# Patient Record
Sex: MALE | Race: BLACK OR AFRICAN AMERICAN | NOT HISPANIC OR LATINO | Employment: FULL TIME | URBAN - METROPOLITAN AREA
[De-identification: names, ages, dates, MRNs, and addresses within clinical notes are randomized per-mention and may not be internally consistent; named-entity substitution may affect disease eponyms.]

---

## 2017-12-26 ENCOUNTER — HOSPITAL ENCOUNTER (EMERGENCY)
Facility: HOSPITAL | Age: 32
Discharge: HOME | End: 2017-12-26
Attending: EMERGENCY MEDICINE | Admitting: EMERGENCY MEDICINE
Payer: COMMERCIAL

## 2017-12-26 VITALS
OXYGEN SATURATION: 99 % | DIASTOLIC BLOOD PRESSURE: 68 MMHG | WEIGHT: 215 LBS | RESPIRATION RATE: 16 BRPM | TEMPERATURE: 97.8 F | HEART RATE: 83 BPM | SYSTOLIC BLOOD PRESSURE: 152 MMHG

## 2017-12-26 DIAGNOSIS — K64.4 EXTERNAL HEMORRHOID: Primary | ICD-10-CM

## 2017-12-26 PROCEDURE — 99282 EMERGENCY DEPT VISIT SF MDM: CPT

## 2017-12-26 NOTE — DISCHARGE INSTRUCTIONS
Use preparation H, Sitz baths, a donut pillow to sit on, and takes stool softeners so that you do not have to push to strain  Follow up with your primary care doctor to make sure you are doing better  If using these treatments does not help, then you may need to be referred to a surgeon  Hemorrhoids   WHAT YOU NEED TO KNOW:   Hemorrhoids are swollen blood vessels inside your rectum (internal hemorrhoids) or on your anus (external hemorrhoids)  Sometimes a hemorrhoid may prolapse  This means it extends out of your anus  DISCHARGE INSTRUCTIONS:   Return to the emergency department if:   · You have severe pain in your rectum or around your anus  · You have severe pain in your abdomen and you are vomiting  · You have bleeding from your anus that soaks through your underwear  Contact your healthcare provider if:   · You have frequent and painful bowel movements  · Your hemorrhoid looks or feels more swollen than usual      · You do not have a bowel movement for 2 days or more  · You see or feel tissue coming through your anus  · You have questions or concerns about your condition or care  Medicines: You may  need any of the following:  · A pad, cream, or ointment  can help decrease pain, swelling, and itching  · Stool softeners  help treat or prevent constipation  · NSAIDs , such as ibuprofen, help decrease swelling, pain, and fever  NSAIDs can cause stomach bleeding or kidney problems in certain people  If you take blood thinner medicine, always ask your healthcare provider if NSAIDs are safe for you  Always read the medicine label and follow directions  · Take your medicine as directed  Contact your healthcare provider if you think your medicine is not helping or if you have side effects  Tell him or her if you are allergic to any medicine  Keep a list of the medicines, vitamins, and herbs you take  Include the amounts, and when and why you take them   Bring the list or the pill bottles to follow-up visits  Carry your medicine list with you in case of an emergency  Manage your symptoms:   · Apply ice on your anus for 15 to 20 minutes every hour or as directed  Use an ice pack, or put crushed ice in a plastic bag  Cover it with a towel before you apply it to your anus  Ice helps prevent tissue damage and decreases swelling and pain  · Take a sitz bath  Fill a bathtub with 4 to 6 inches of warm water  You may also use a sitz bath pan that fits inside a toilet bowl  Sit in the sitz bath for 15 minutes  Do this 3 times a day, and after each bowel movement  The warm water can help decrease pain and swelling  · Keep your anal area clean  Gently wash the area with warm water daily  Soap may irritate the area  After a bowel movement, wipe with moist towelettes or wet toilet paper  Dry toilet paper can irritate the area  Prevent hemorrhoids:   · Do not strain to have a bowel movement  Do not sit on the toilet too long  These actions can increase pressure on the tissues in your rectum and anus  · Drink plenty of liquids  Liquids can help prevent constipation  Ask how much liquid to drink each day and which liquids are best for you  · Eat a variety of high-fiber foods  Examples include fruits, vegetables, and whole grains  Ask your healthcare provider how much fiber you need each day  You may need to take a fiber supplement  · Exercise as directed  Exercise, such as walking, may make it easier to have a bowel movement  Ask your healthcare provider to help you create an exercise plan  · Do not have anal sex  Anal sex can weaken the skin around your rectum and anus  · Avoid heavy lifting  This can cause straining and increase your risk for another hemorrhoid  Follow up with your healthcare provider as directed:  Write down your questions so you remember to ask them during your visits     © 2017 [employer] Information is for End User's use only and may not be sold, redistributed or otherwise used for commercial purposes  All illustrations and images included in CareNotes® are the copyrighted property of [employer] Inc  or Doroteo [employer]  The above information is an  only  It is not intended as medical advice for individual conditions or treatments  Talk to your doctor, nurse or pharmacist before following any medical regimen to see if it is safe and effective for you

## 2017-12-26 NOTE — ED PROVIDER NOTES
History  Chief Complaint   Patient presents with    Hemorrhoids     pt states "I've had some heavy rectal bleeding for two months from hemorrhoids but now my hemorrhoid is external" pt reports no bleeding for 2 days     HPI    None       Past Medical History:   Diagnosis Date    Hemorrhoids        History reviewed  No pertinent surgical history  History reviewed  No pertinent family history  I have reviewed and agree with the history as documented  Social History   Substance Use Topics    Smoking status: Never Smoker    Smokeless tobacco: Never Used    Alcohol use 3 0 oz/week     5 Cans of beer per week        Review of Systems    Physical Exam  ED Triage Vitals   Temperature Pulse Respirations Blood Pressure SpO2   12/26/17 1758 12/26/17 1756 12/26/17 1756 12/26/17 1756 12/26/17 1756   97 8 °F (36 6 °C) 83 16 152/68 99 %      Temp Source Heart Rate Source Patient Position - Orthostatic VS BP Location FiO2 (%)   12/26/17 1758 12/26/17 1756 12/26/17 1756 12/26/17 1756 --   Temporal Monitor Sitting Left arm       Pain Score       12/26/17 1756       5           Orthostatic Vital Signs  Vitals:    12/26/17 1756   BP: 152/68   Pulse: 83   Patient Position - Orthostatic VS: Sitting       Physical Exam   Constitutional: He is oriented to person, place, and time  He appears well-developed and well-nourished  No distress  HENT:   Head: Normocephalic and atraumatic  Mouth/Throat: Oropharynx is clear and moist    Eyes: Conjunctivae are normal  Pupils are equal, round, and reactive to light  Neck: Normal range of motion  No tracheal deviation present  Cardiovascular: Normal rate, regular rhythm, normal heart sounds and intact distal pulses  Pulmonary/Chest: Effort normal and breath sounds normal  No respiratory distress  Abdominal: Soft  Bowel sounds are normal  He exhibits no distension  There is no tenderness     Genitourinary: Rectal exam shows external hemorrhoid (large, tender, not thrombosed, no active bleeding )  Rectal exam shows no fissure  Genitourinary Comments: Chaperoned by Sandrita Huynh, patient care [employer] [facility]   Neurological: He is alert and oriented to person, place, and time  GCS eye subscore is 4  GCS verbal subscore is 5  GCS motor subscore is 6  Skin: Skin is warm and dry  Psychiatric: He has a normal mood and affect  His behavior is normal    Nursing note and vitals reviewed  ED Medications  Medications - No data to display    Diagnostic Studies  Results Reviewed     None                 No orders to display              Procedures  Procedures       Phone Contacts  ED Phone Contact    ED Course  ED Course                                MDM  Number of Diagnoses or Management Options  External hemorrhoid: new and does not require workup  Diagnosis management comments: This is a 44-year-old male who presents here with hemorrhoids  He states he has had these for awhile and has had "heavy bleeding" from these, which he states is the entire bowl being filled with blood  He does pass stool and blood with it  There is no blood on his clothes between bowel movements  He has had problems with constipation and frequently has to push and strain to move his bowels  He states he feels like hemorrhoid is getting bigger  It has been more painful recently  He has not had as much blood the past couple of days as he has had previously  He has not been taking or doing anything for his symptoms  He states he has mentioned this to his doctor and was told that it was heal on his own, and was not told to take or doing thing for this  He has not followed up with his doctor for this  He denies any abdominal pain, bleeding dyscrasias, blood thinning medication, other sites of bleeding  Per review of Care everywhere, he was most recently seen on 11/30/17 by a the PA for his doctor, was told to use preparation H, Sitz baths, increase his fiber, Metamucil, and stool softener    He did have blood work done that showed a hemoglobin of 14 8     ROS: Otherwise negative, unless stated as above  He is well-appearing, in no acute distress  He has a large external hemorrhoid that is not thrombosed nor actively bleeding  I discussed with him treatment of this at home, that it will not improve unless he does when he is advised follow up with his primary care doctor for further evaluation, and indications for return, and he expresses understanding with this plan  CritCare Time    Disposition  Final diagnoses:   External hemorrhoid     Time reflects when diagnosis was documented in both MDM as applicable and the Disposition within this note     Time User Action Codes Description Comment    12/26/2017  6:35 PM [facility] External hemorrhoid       ED Disposition     ED Disposition Condition Comment    Discharge  Asenath Chill discharge to home/self care  Condition at discharge: Good        Follow-up Information     Follow up With Specialties Details Why Miles Jennings MD  Schedule an appointment as soon as possible for a visit to follow up on your symptoms [address] [place] [address] [id number] [place] [address] [address]  [phone]          Patient's Medications    No medications on file     No discharge procedures on file      ED Provider  Electronically Signed by           Aysha Renee MD  12/26/17 2005

## 2019-05-07 ENCOUNTER — APPOINTMENT (EMERGENCY)
Dept: RADIOLOGY | Facility: HOSPITAL | Age: 34
End: 2019-05-07
Payer: COMMERCIAL

## 2019-05-07 ENCOUNTER — HOSPITAL ENCOUNTER (EMERGENCY)
Facility: HOSPITAL | Age: 34
Discharge: HOME | End: 2019-05-07
Attending: EMERGENCY MEDICINE
Payer: COMMERCIAL

## 2019-05-07 ENCOUNTER — TELEPHONE (OUTPATIENT)
Dept: OBGYN CLINIC | Facility: CLINIC | Age: 34
End: 2019-05-07

## 2019-05-07 VITALS
RESPIRATION RATE: 16 BRPM | DIASTOLIC BLOOD PRESSURE: 75 MMHG | HEART RATE: 71 BPM | WEIGHT: 215 LBS | SYSTOLIC BLOOD PRESSURE: 135 MMHG | BODY MASS INDEX: 26.73 KG/M2 | HEIGHT: 75 IN | OXYGEN SATURATION: 100 % | TEMPERATURE: 98 F

## 2019-05-07 DIAGNOSIS — S43.401A SPRAIN OF RIGHT SHOULDER: Primary | ICD-10-CM

## 2019-05-07 DIAGNOSIS — M77.8 TENDONITIS OF SHOULDER, RIGHT: ICD-10-CM

## 2019-05-07 PROCEDURE — 99283 EMERGENCY DEPT VISIT LOW MDM: CPT | Performed by: EMERGENCY MEDICINE

## 2019-05-07 PROCEDURE — 73030 X-RAY EXAM OF SHOULDER: CPT

## 2019-05-07 PROCEDURE — 99283 EMERGENCY DEPT VISIT LOW MDM: CPT

## 2019-05-07 RX ORDER — NAPROXEN 500 MG/1
500 TABLET ORAL 2 TIMES DAILY WITH MEALS
Qty: 20 TABLET | Refills: 0 | Status: SHIPPED | OUTPATIENT
Start: 2019-05-07 | End: 2019-07-31

## 2019-07-31 ENCOUNTER — OFFICE VISIT (OUTPATIENT)
Dept: OBGYN CLINIC | Facility: CLINIC | Age: 34
End: 2019-07-31
Payer: COMMERCIAL

## 2019-07-31 VITALS
HEART RATE: 71 BPM | HEIGHT: 75 IN | WEIGHT: 221 LBS | SYSTOLIC BLOOD PRESSURE: 120 MMHG | DIASTOLIC BLOOD PRESSURE: 74 MMHG | BODY MASS INDEX: 27.48 KG/M2

## 2019-07-31 DIAGNOSIS — M75.21 BICEPS TENDINITIS OF RIGHT UPPER EXTREMITY: Primary | ICD-10-CM

## 2019-07-31 PROCEDURE — 99203 OFFICE O/P NEW LOW 30 MIN: CPT | Performed by: PHYSICIAN ASSISTANT

## 2019-07-31 NOTE — PROGRESS NOTES
_____________________________________________________  CHIEF COMPLAINT:  Chief Complaint   Patient presents with    Right Shoulder - Pain         SUBJECTIVE:  Julian Keen is a 30y o  year old male who presents right shoulder pain  Patient states has been going on for a year  He is unsure as to when the injury occurred  He states he may have been playing organized sports or he may have been lifting something heavy  He has not been taking any anti-inflammatories  He states that the pain is constant  He notes worse pain overhead movements  He states most of his pain is in the anterior aspect of his shoulder  He has not had any surgical intervention  He has not done any physical therapy  He denies any numbness or tingling  He denies any constitutional signs or symptoms  PAST MEDICAL HISTORY:  Past Medical History:   Diagnosis Date    Hemorrhoids        PAST SURGICAL HISTORY:  History reviewed  No pertinent surgical history  FAMILY HISTORY:  History reviewed  No pertinent family history  SOCIAL HISTORY:  Social History     Tobacco Use    Smoking status: Never Smoker    Smokeless tobacco: Never Used   Substance Use Topics    Alcohol use: Yes     Alcohol/week: 5 0 standard drinks     Types: 5 Cans of beer per week    Drug use: No       MEDICATIONS:  No current outpatient medications on file      ALLERGIES:  No Known Allergies    REVIEW OF SYSTEMS:  General: no fever, no chills  HEENT:  No loss of hearing or eyesight problems  Eyes:  No red eyes  Respiratory:  No coughing, shortness of breath or wheezing  Cardiovascular:  No chest pain, no palpitations  GI:  Abdomen soft nontender, denies nausea  Endocrine:  No muscle weakness, no frequent urination, no excessive thirst  Urinary:  No dysuria, no incontinence  Musculoskeletal: see HPI and PE  SKIN:  No skin rash, no dry skin  Neurological:  No headaches, no confusion  Psychiatric:  No suicide thoughts, no anxiety, no depression  Review of all other systems is negative    LABS:  HgA1c: No results found for: HGBA1C  BMP: No results found for: GLUCOSE, CALCIUM, NA, K, CO2, CL, BUN, CREATININE    _____________________________________________________  PHYSICAL EXAMINATION:  Vitals:    07/31/19 1104   BP: 120/74   Pulse: 71       General: well developed and well nourished, alert, oriented times 3 and appears comfortable  Psychiatric: Normal  HEENT: Trachea Midline, No torticollis  Cardiovascular: No discernable arrhythmia  Pulmonary: No wheezing or stridor  Skin: No masses, erthema, lacerations, fluctation, ulcerations  Neurovascular: Sensation Intact to the Median, Ulnar, Radial Nerve, Motor Intact to the Median, Ulnar, Radial Nerve and Pulses Intact    MUSCULOSKELETAL EXAMINATION:  Right shoulder  No erythema edema or ecchymosis noted  Skin is warm to touch  Tender to palpation over the anterior aspect of the shoulder  Range of motion is within normal limits for flexion, extension, internal and external rotation, abduction and adduction  Strength is 5/5 in all directions  Negative empty can test, negative Yoon test, negative Taylor's test, positive speed's test for pain over the biceps tendon  Patient is neurovascularly intact     _____________________________________________________  STUDIES REVIEWED:  I personally reviewed the x-rays from 05/07/2019  X-rays demonstrate no acute fractures or dislocations  Glenohumeral joint is well aligned        ASSESSMENT/PLAN:        Biceps tendinitis of right upper extremity  - it was discussed with the patient that he will start to take some anti-inflammatories as well as start physical therapy to work on range of motion, stretching, strengthening modalities as needed for pain  - he was advised that he can use his shoulder and upper extremity as tolerated for his ADLs  - he will follow up in 6 weeks with Dr Dea Haddad for re-evaluation    Follow Up:  Six weeks    PROCEDURES PERFORMED:  None Septic encephalopathy

## 2020-01-07 ENCOUNTER — OFFICE VISIT (OUTPATIENT)
Dept: FAMILY MEDICINE CLINIC | Facility: CLINIC | Age: 35
End: 2020-01-07
Payer: COMMERCIAL

## 2020-01-07 VITALS
DIASTOLIC BLOOD PRESSURE: 80 MMHG | TEMPERATURE: 97 F | HEIGHT: 73 IN | HEART RATE: 76 BPM | RESPIRATION RATE: 16 BRPM | SYSTOLIC BLOOD PRESSURE: 130 MMHG | BODY MASS INDEX: 30.06 KG/M2 | WEIGHT: 226.8 LBS

## 2020-01-07 DIAGNOSIS — M25.551 PAIN OF RIGHT HIP JOINT: ICD-10-CM

## 2020-01-07 DIAGNOSIS — L30.9 DERMATITIS: Primary | ICD-10-CM

## 2020-01-07 PROCEDURE — 3008F BODY MASS INDEX DOCD: CPT | Performed by: FAMILY MEDICINE

## 2020-01-07 PROCEDURE — 99203 OFFICE O/P NEW LOW 30 MIN: CPT | Performed by: FAMILY MEDICINE

## 2020-01-07 RX ORDER — PREDNISONE 50 MG/1
50 TABLET ORAL
Qty: 5 TABLET | Refills: 0 | Status: SHIPPED | OUTPATIENT
Start: 2020-01-07 | End: 2020-01-12

## 2020-01-07 NOTE — PROGRESS NOTES
Assessment/Plan     Dermatitis  - larger lesion on thigh could represent herald patch however, extensive puritis less likely with pityriasis rosea  More likely contact dermatitis to allergen  Advised patient not keep same laundry detergent and not use perfumed soaps on skin  - patient requesting blood for allergy testing, will order [facility] panel to check IgE levels  - No signs of scabies  - will prescribe prednisone 50 mg for 5 days, return if rash does not improve  Patient requesting referral to allergist for specific skin testing to figure out definitive allergies  Pain of right hip joint  - benign physical exam except IT band tightness near knee, no physical exam findings of bursitis, was previously a , could have underlying labrum issue, will refer to sports medicine for further workup  Diagnoses and all orders for this visit:    Dermatitis  -     [facility], Adult; Future  -     Food Specific IgG Allergy (Adult) Panel; Future  -     Ambulatory referral to Allergy; Future  -     predniSONE 50 mg tablet; Take 1 tablet (50 mg total) by mouth daily with lunch for 5 days    Pain of right hip joint  -     Ambulatory referral to Sports Medicine; Future         Subjective     Chief Complaint: Rash    HPI:   This is a 31 yo M who presents for 3 days of rash  He noticed the rash 3 days ago on his back that has since spread to his bilateral upper and lower extremity and trunk  He did not notice any recent bug bites or ticks  He reports the rash is very pruritic and did not try any over the counter medications  He also reports several weeks of Hip pain  He reports that this is noticed when he lays down on this side  He reports he was previous athletic when he was younger, hasn't recently gone to the gym, does report a previous labrum tear in shoulder when he was a        FM history: dad had prostate cancer at unknown age, mother had breast cancer, no history of colon cancer           Review of Systems  Review of Systems   Constitutional: Negative for activity change, fatigue and fever  HENT: Negative for congestion  Respiratory: Negative for cough and shortness of breath  Cardiovascular: Negative for chest pain and palpitations  Gastrointestinal: Negative for abdominal distention, constipation, diarrhea, nausea and vomiting  Musculoskeletal:        As noted in HPI    Skin: Positive for rash  Neurological: Negative for dizziness and headaches  Objective   Vitals:    01/07/20 1351   BP: 130/80   Pulse: 76   Resp: 16   Temp: (!) 97 °F (36 1 °C)       Physical Exam   Constitutional: He appears well-developed and well-nourished  No distress  HENT:   Mouth/Throat: Oropharynx is clear and moist    Neck: Neck supple  Cardiovascular: Normal rate, regular rhythm and normal heart sounds  Exam reveals no friction rub  No murmur heard  Pulmonary/Chest: Effort normal and breath sounds normal  He has no wheezes  He has no rales  Abdominal: Soft  Bowel sounds are normal  He exhibits no distension  There is no tenderness  Musculoskeletal:   No tenderness to palpation over greater trochanter or IT band, Negative JOSÉ test, good ROM of hip with no crepitus or clicking of joint  Tight distal IT band  Skin: He is not diaphoretic  Diffuse papular erythematous rash, larger erythematous round lesion on left thigh  Lab Results: I have reviewed all the lab results

## 2020-01-07 NOTE — PATIENT INSTRUCTIONS
Urticaria   WHAT YOU NEED TO KNOW:   What is urticaria? Urticaria is also called hives  Hives can change size and shape, and appear anywhere on your skin  They can be mild or severe and last from a few minutes to a few days  Hives may be a sign of a severe allergic reaction called anaphylaxis that needs immediate treatment  Urticaria that lasts longer than 6 weeks may be a chronic condition that needs long-term treatment  What causes urticaria? Hives are caused by an immune system reaction  The following are common triggers:  · Food allergies, such as to nuts, eggs, or shellfish    · Food dyes, additives, or preservatives    · Medicine allergies, such as to ibuprofen or antibiotics    · Infections, such as a cold or mono    · Bug bites    · Pets, plants, or latex    · Stress  How is the cause of urticaria diagnosed? Your healthcare provider will examine you and ask about your symptoms  He may also ask about your family medical history, medicines you take, and foods you eat  Tell your healthcare provider about any recent trauma, stress, or contact with allergens  You may need additional testing if you developed anaphylaxis after you were exposed to a trigger and then exercised  This is called exercise-induced anaphylaxis  You may need any of the following:  · A skin test  is used to see how your skin reacts to possible triggers  Your healthcare provider will put a small amount of the trigger onto your skin  He will cover the area with a patch that stays on for 2 days  Then he will check your skin for a reaction  · A challenge test  is used to give you increasing doses of what may be causing your hives  Your healthcare provider will watch for a reaction  How is urticaria treated? Hives often go away without treatment  Chronic urticaria may need to be treated with more than one medicine, or other medicines than listed below   The following are common medicines used to treat urticaria:  · Antihistamines decrease mild symptoms such as itching or a rash  · Steroids  decrease redness, pain, and swelling  · Epinephrine  is used to treat severe allergic reactions such as anaphylaxis  What steps do I need to take for signs or symptoms of anaphylaxis? · Immediately  give 1 shot of epinephrine only into the outer thigh muscle  · Leave the shot in place  as directed  Your healthcare provider may recommend you leave it in place for up to 10 seconds before you remove it  This helps make sure all of the epinephrine is delivered  · Call 911 and go to the emergency department,  even if the shot improved symptoms  Do not drive yourself  Bring the used epinephrine shot with you  What safety precautions do I need to take if I am at risk for anaphylaxis? · Keep 2 shots of epinephrine with you at all times  You may need a second shot, because epinephrine only works for about 20 minutes and symptoms may return  Your healthcare provider can show you and family members how to give the shot  Check the expiration date every month and replace it before it expires  · Create an action plan  Your healthcare provider can help you create a written plan that explains the allergy and an emergency plan to treat a reaction  The plan explains when to give a second epinephrine shot if symptoms return or do not improve after the first  Give copies of the action plan and emergency instructions to family members, work and school staff, and  providers  Show them how to give a shot of epinephrine  · Be careful when you exercise  If you have had exercise-induced anaphylaxis, do not exercise right after you eat  Stop exercising right away if you start to develop any signs or symptoms of anaphylaxis  You may first feel tired, warm, or have itchy skin  Hives, swelling, and severe breathing problems may develop if you continue to exercise  · Carry medical alert identification    Wear medical alert jewelry or carry a card that explains the allergy  Ask your healthcare provider where to get these items  · Keep a record of triggers and symptoms  Record everything you eat, drink, or apply to your skin for 3 weeks  Include stressful events and what you were doing right before your hives started  Bring the record with you to follow-up visits with your healthcare provider  What can I do to manage urticaria? · Cool your skin  This may help decrease itching  Apply a cool pack to your hives  Dip a hand towel in cool water, wring it out, and place it on your hives  You may also soak your skin in a cool oatmeal bath  · Do not rub your hives  This can irritate your skin and cause more hives  · Wear loose clothing  Tight clothes may irritate your skin and cause more hives  · Manage stress  Stress may trigger hives, or make them worse  Learn new ways to relax, such as deep breathing  Call 911 for signs or symptoms of anaphylaxis,  such as trouble breathing, swelling in your mouth or throat, or wheezing  You may also have itching, a rash, or feel like you are going to faint  When should I seek immediate care? · Your heart is beating faster than it normally does  · You have cramping or severe pain in your abdomen  When should I contact my healthcare provider? · You have a fever  · Your skin still itches 24 hours after you take your medicine  · You still have hives after 7 days  · Your joints are painful and swollen  · You have questions or concerns about your condition or care  CARE AGREEMENT:   You have the right to help plan your care  Learn about your health condition and how it may be treated  Discuss treatment options with your caregivers to decide what care you want to receive  You always have the right to refuse treatment  The above information is an  only  It is not intended as medical advice for individual conditions or treatments   Talk to your doctor, nurse or pharmacist before following any medical regimen to see if it is safe and effective for you  © 2017 [employer] Information is for End User's use only and may not be sold, redistributed or otherwise used for commercial purposes  All illustrations and images included in CareNotes® are the copyrighted property of [employer]  or [employer]  Iliotibial Band Syndrome Exercises   WHAT YOU NEED TO KNOW:   What do I need to know about iliotibial band syndrome (ITBS)? ITBS, also known as runner's knee, happens when your iliotibial band (ITB) becomes injured and causes pain  The ITB is a long band of tissue  It extends from the outside of your pelvis (hip bone) to the outside of your tibia (shin bone)  It helps keeps the knee in the correct position when you stand or move  ITBS occurs most often in long distance runners and cyclists  How can I decrease pain and swelling? · Apply ice  on your hip, knee, or thigh for 15 to 20 minutes every hour or as directed  Use an ice pack, or put crushed ice in a plastic bag  Cover it with a towel  Ice helps prevent tissue damage and decreases swelling and pain  · Apply heat  on your hip, knee, or thigh for 20 to 30 minutes before you stretch or exercise  Use a heat pack or wet a washcloth and heat it for 15 seconds in the microwave  Heat helps decrease pain and makes it easier to stretch your muscles  · Massage painful areas as directed  Use a foam roller to gently massage your painful areas  Place the foam roller on a flat surface  Lie on your side with the foam roller against your painful leg  Move your body so that it rolls up and down from your hip to above your knee  Do not lie with it against the outside of your knee cap  What do I need to know about ITB exercises? ITB exercises help strengthen the muscles around your knee and hip  Strong muscles can help reduce pain and decrease your risk of future injury     What do I need to know about exercise safety? Do not start an exercise program before you talk to your healthcare provider  You may need to wait until your swelling and pain have gone down before you start to exercise  · Move slowly and smoothly  Avoid fast or jerky motions  This will help prevent another injury  · Breathe normally  Do not hold your breath  It is important to breathe in and out so you do not tense up during exercise  Tension could prevent you from moving your joint in a full range of motion  · Do the exercises and stretches on both legs  Do this so both ITBs remain strong and flexible  · Stop if you feel sharp pain or an increase in pain  Stop the exercise and contact your healthcare provider if you have these symptoms  It is normal to feel some discomfort, such as a dull ache, during exercise  Regular exercise will help decrease your discomfort over time  · Warm up before you stretch and exercise  This will help prevent an injury  Walk or ride a stationary bike for 5 to 10 minutes  How do I perform ITB stretches? Always stretch before and after you do strengthening exercises  Hold each stretch for 30 seconds to 1 minute  Repeat each stretch 2 to 3 times or as directed  · Standing ITB stretch:  Stand with your injured leg behind your other leg  Cross your front leg over your injured leg  Bend sideways toward the hip that is not injured  Stop when you feel a stretch in the hip of your injured leg  Repeat on the other side  · Lying ITB stretch:  Lie on your back  Bend the knee of your injured leg toward your chest  Place your hand on the outside of your thigh  Slowly pull your knee across your body  Stop when you feel a stretch in your hip and outside of your thigh  Repeat on the other side  · Hip stretch:  Lie on the ground  Place both hands on the shin of 1 leg  Pull your knee toward your chest  Repeat on the other side             · Standing quadriceps stretch:  Stand and place one hand against a wall or hold the back of a chair for balance  With your weight on one leg, bend your other leg and grab your ankle  Pull your heel toward your buttocks  · Sitting hamstring stretch:  Sit with both legs straight in front of you  Place your palms on the floor and slide your hands forward until you feel the stretch  If possible, grab your toes  Do not round your back  How do I perform ITB strengthening exercises? Your healthcare provider will tell you how often to do the following exercises:  · Standing half squats:  Stand with your feet shoulder-width apart  Lean your back against a wall or hold the back of a chair for balance, if needed  Slowly sit down about 10 inches, as if you are going to sit in a chair  Put most of your weight in your heels  Hold the squat for 5 seconds, then slowly rise to a standing position  Do 3 sets of 10 squats  · Sitting leg lifts:  Sit in a chair with both feet flat on the floor  Slowly straighten and raise one leg  Squeeze your thigh muscles and hold for 5 seconds  Relax and return your foot to the floor  Do 3 sets of 10 lifts on each leg  · Single leg dips:  Stand on your injured leg, between 2 chairs  The backs of the chairs should be toward you  Put 1 hand on each chair  Straighten your leg that is not injured and lift it off the floor  Use the chairs to hold some of your weight  Bend the knee of your injured leg  Slowly lower your body toward the floor a few inches  Your weight should be in your heel  Hold for 5 seconds  Slowly return to a standing position  Do 3 sets of 10 on each leg  · Standing hamstring curls: Face a wall and place both palms flat on the wall  Instead you can hold the back of a chair for balance  With your weight on 1 leg, lift your other foot as close to your buttocks as you can  Hold for 5 seconds and then lower your leg  Do 3 sets of 10 curls on each leg             · Hip adduction:  Lie on your injured side  Cross your top leg over your injured leg  Put your foot on the floor in front of you  Raise your injured leg until it touches the other leg  Slowly lower the leg to the floor  Do 3 sets of 10 on each leg  · Hip abduction:  Lie on your side that is not injured  Straighten your legs  Slowly raise your injured leg as high as you can  Keep your foot pointing straight  Hold for 5 seconds then slowly lower your leg  Do 3 sets of 10 on each leg  When should I contact my healthcare provider? · You have sharp pain during exercise or at rest     · You have questions or concerns about stretches or exercises  CARE AGREEMENT:   You have the right to help plan your care  Learn about your health condition and how it may be treated  Discuss treatment options with your caregivers to decide what care you want to receive  You always have the right to refuse treatment  The above information is an  only  It is not intended as medical advice for individual conditions or treatments  Talk to your doctor, nurse or pharmacist before following any medical regimen to see if it is safe and effective for you  © 2017 [facility] Information is for End User's use only and may not be sold, redistributed or otherwise used for commercial purposes  All illustrations and images included in CareNotes® are the copyrighted property of A [employer] M [employer]  or [employer]

## 2020-01-07 NOTE — ASSESSMENT & PLAN NOTE
- larger lesion on thigh could represent herald patch however, extensive puritis less likely with pityriasis rosea  More likely contact dermatitis to allergen  Advised patient not keep same laundry detergent and not use perfumed soaps on skin  - patient requesting blood for allergy testing, will order [facility] allergy panel to check IgE levels  - No signs of scabies  - will prescribe prednisone 50 mg for 5 days, return if rash does not improve  Patient requesting referral to allergist for specific skin testing to figure out definitive allergies

## 2020-01-07 NOTE — ASSESSMENT & PLAN NOTE
- benign physical exam except IT band tightness near knee, no physical exam findings of bursitis, was previously a , could have underlying labrum issue, will refer to sports medicine for further workup

## 2020-03-02 ENCOUNTER — APPOINTMENT (OUTPATIENT)
Dept: LAB | Facility: AMBULARY SURGERY CENTER | Age: 35
End: 2020-03-02
Attending: INTERNAL MEDICINE
Payer: COMMERCIAL

## 2020-03-02 ENCOUNTER — OFFICE VISIT (OUTPATIENT)
Dept: GASTROENTEROLOGY | Facility: AMBULARY SURGERY CENTER | Age: 35
End: 2020-03-02
Payer: COMMERCIAL

## 2020-03-02 VITALS
HEART RATE: 70 BPM | SYSTOLIC BLOOD PRESSURE: 118 MMHG | HEIGHT: 72 IN | DIASTOLIC BLOOD PRESSURE: 70 MMHG | RESPIRATION RATE: 18 BRPM | WEIGHT: 220.6 LBS | TEMPERATURE: 97.3 F | BODY MASS INDEX: 29.88 KG/M2

## 2020-03-02 DIAGNOSIS — K62.5 RECTAL BLEEDING: Primary | ICD-10-CM

## 2020-03-02 DIAGNOSIS — Z12.11 SCREENING FOR COLON CANCER: Primary | ICD-10-CM

## 2020-03-02 DIAGNOSIS — K62.5 RECTAL BLEEDING: ICD-10-CM

## 2020-03-02 DIAGNOSIS — Z12.11 SCREENING FOR COLON CANCER: ICD-10-CM

## 2020-03-02 LAB
ERYTHROCYTE [DISTWIDTH] IN BLOOD BY AUTOMATED COUNT: 13.3 % (ref 11.6–15.1)
HCT VFR BLD AUTO: 39.6 % (ref 36.5–49.3)
HGB BLD-MCNC: 13.8 G/DL (ref 12–17)
MCH RBC QN AUTO: 29.6 PG (ref 26.8–34.3)
MCHC RBC AUTO-ENTMCNC: 34.8 G/DL (ref 31.4–37.4)
MCV RBC AUTO: 85 FL (ref 82–98)
PLATELET # BLD AUTO: 262 THOUSANDS/UL (ref 149–390)
PMV BLD AUTO: 10.1 FL (ref 8.9–12.7)
RBC # BLD AUTO: 4.67 MILLION/UL (ref 3.88–5.62)
WBC # BLD AUTO: 5.31 THOUSAND/UL (ref 4.31–10.16)

## 2020-03-02 PROCEDURE — 99244 OFF/OP CNSLTJ NEW/EST MOD 40: CPT | Performed by: INTERNAL MEDICINE

## 2020-03-02 PROCEDURE — 36415 COLL VENOUS BLD VENIPUNCTURE: CPT

## 2020-03-02 PROCEDURE — 85027 COMPLETE CBC AUTOMATED: CPT

## 2020-03-02 PROCEDURE — 3008F BODY MASS INDEX DOCD: CPT | Performed by: INTERNAL MEDICINE

## 2020-03-02 NOTE — LETTER
March 2, 2020     [facility] [address] [facility]    Patient: Magdalene Marmolejo   YOB: 1985   Date of Visit: 3/2/2020       Dear Dr Nolan Hammonds: Thank you for referring Magdalene Marmolejo to me for evaluation  Below are my notes for this consultation  If you have questions, please do not hesitate to call me  I look forward to following your patient along with you  Sincerely,        Grace Chavez MD        CC: MD Grace Kiran MD  3/2/2020 11:43 AM  Sign at close encounter  Consultation - [facility]  Magdalene Marmolejo 30 y o  male MRN: [id number]  Unit/Bed#:  Encounter: [id number]        Consults    ASSESSMENT/PLAN:     1  Alternating diarrhea with constipation and Rectal bleeding-suspect may have IBD with proctitis versus hemorrhoidal bleed versus polyp   -will check CBC  -encourage high-fiber diet  -will plan for colonoscopy to assess for proctitis and colon polyps  -increase water intake to 64 oz daily  -further recommendations pending the results of colonoscopy  - Patient was explained about  the risks and benefits of the procedure  Risks including but not limited to bleeding, infection, perforation were explained in detail  Also explained about less than 100% sensitivity with the exam and other alternatives  ______________________________________________________________________    Reason for Consult / Principal Problem: [unfilled]    HPI: Magdalene Marmolejo is a 30y o  year old male with history of dermatitis, presents for evaluation of rectal bleeding along with alternating diarrhea and constipation  Patient reports that he has had rectal bleeding on and off for several months but has recently noted change in bowel movements with more loose bowel movements  He also endorses changing his diet which incorporates eliminating carbohydrates  Does endorse that he has not been getting adequate of fiber in his diet  No other NSAID use  No family history of inflammatory bowel disease or colon cancer  Patient is a nonsmoker  Patient underwent blood work last year which showed normal hemoglobin, normal renal function, platelets of 948  Review of Systems: The remainder of the review of systems was negative except for the pertinent positives noted in HPI  Historical Information   Past Medical History:   Diagnosis Date    Hemorrhoids      No past surgical history on file  Social History   Social History     Substance and Sexual Activity   Alcohol Use Yes    Alcohol/week: 5 0 standard drinks    Types: 5 Cans of beer per week     Social History     Substance and Sexual Activity   Drug Use No     Social History     Tobacco Use   Smoking Status Never Smoker   Smokeless Tobacco Never Used     No family history on file  Meds/Allergies       (Not in a hospital admission)  No current facility-administered medications for this visit  No Known Allergies    Objective     Blood pressure 118/70, pulse 70, temperature (!) 97 3 °F (36 3 °C), resp  rate 18, height 6' (1 829 m), weight 100 kg (220 lb 9 6 oz)  [unfilled]    PHYSICAL EXAM     GEN: well nourished, well developed, no acute distress  HEENT: anicteric, MMM, no cervical or supraclavicular lymphadenopathy  CV: RRR, no m/r/g  CHEST: CTA b/l, no WRR  ABD: +BS, soft, NT/ND, no hepatosplenomegaly  EXT: no c/c/e  SKIN: no rashes,  NEURO: aaox3  Rectal exam-small external hemorrhoids which are nonbleeding, small amount of blood on exam     Lab Results:   No visits with results within 1 Day(s) from this visit  Latest known visit with results is:   No results found for any previous visit       Imaging Studies: I have personally reviewed pertinent films in PACS

## 2020-03-02 NOTE — PROGRESS NOTES
Consultation - [facility]  Allen Ma 30 y o  male MRN: [id number]  Unit/Bed#:  Encounter: [id number]        Consults    ASSESSMENT/PLAN:     1  Alternating diarrhea with constipation and Rectal bleeding-suspect may have IBD with proctitis versus hemorrhoidal bleed versus polyp   -will check CBC  -encourage high-fiber diet  -will plan for colonoscopy to assess for proctitis and colon polyps  -increase water intake to 64 oz daily  -further recommendations pending the results of colonoscopy  - Patient was explained about  the risks and benefits of the procedure  Risks including but not limited to bleeding, infection, perforation were explained in detail  Also explained about less than 100% sensitivity with the exam and other alternatives  ______________________________________________________________________    Reason for Consult / Principal Problem: [unfilled]    HPI: Allen Ma is a 30y o  year old male with history of dermatitis, presents for evaluation of rectal bleeding along with alternating diarrhea and constipation  Patient reports that he has had rectal bleeding on and off for several months but has recently noted change in bowel movements with more loose bowel movements  He also endorses changing his diet which incorporates eliminating carbohydrates  Does endorse that he has not been getting adequate of fiber in his diet  No other NSAID use  No family history of inflammatory bowel disease or colon cancer  Patient is a nonsmoker  Patient underwent blood work last year which showed normal hemoglobin, normal renal function, platelets of 362  Review of Systems: The remainder of the review of systems was negative except for the pertinent positives noted in HPI  Historical Information   Past Medical History:   Diagnosis Date    Hemorrhoids      No past surgical history on file    Social History   Social History     Substance and Sexual Activity Alcohol Use Yes    Alcohol/week: 5 0 standard drinks    Types: 5 Cans of beer per week     Social History     Substance and Sexual Activity   Drug Use No     Social History     Tobacco Use   Smoking Status Never Smoker   Smokeless Tobacco Never Used     No family history on file  Meds/Allergies       (Not in a hospital admission)  No current facility-administered medications for this visit  No Known Allergies    Objective     Blood pressure 118/70, pulse 70, temperature (!) 97 3 °F (36 3 °C), resp  rate 18, height 6' (1 829 m), weight 100 kg (220 lb 9 6 oz)  [unfilled]    PHYSICAL EXAM     GEN: well nourished, well developed, no acute distress  HEENT: anicteric, MMM, no cervical or supraclavicular lymphadenopathy  CV: RRR, no m/r/g  CHEST: CTA b/l, no WRR  ABD: +BS, soft, NT/ND, no hepatosplenomegaly  EXT: no c/c/e  SKIN: no rashes,  NEURO: aaox3  Rectal exam-small external hemorrhoids which are nonbleeding, small amount of blood on exam     Lab Results:   No visits with results within 1 Day(s) from this visit  Latest known visit with results is:   No results found for any previous visit       Imaging Studies: I have personally reviewed pertinent films in PACS

## 2020-03-17 ENCOUNTER — TELEPHONE (OUTPATIENT)
Dept: FAMILY MEDICINE CLINIC | Facility: CLINIC | Age: 35
End: 2020-03-17

## 2020-03-17 NOTE — TELEPHONE ENCOUNTER
Patient called requesting to be tested for Covid 19  Patient stated that he was in [place] right before they closed the [place]  Patient stated that he has nasal congestion, headache and fatigue  No fever  Patient stated that his girlfriend that was with him in [place] has a cough and sore throat  No fever

## 2020-03-17 NOTE — TELEPHONE ENCOUNTER
I spoke with the patient - with the absence of fever, along with the mildness of cough and congestion more consistent with viral URI vs allergies, COVID-19 is unlikely and the recommendation is not to test   Recommended staying home while feeling unwell and keeping us posted with any change in symptoms  Patient expressed understanding and agreement

## 2020-03-31 ENCOUNTER — TELEPHONE (OUTPATIENT)
Dept: GASTROENTEROLOGY | Facility: CLINIC | Age: 35
End: 2020-03-31

## 2020-03-31 NOTE — TELEPHONE ENCOUNTER
Per recommendation from Dr Parvin Eid procedure has been cancelled due to COVID-19  (non emergency) Pt has been rescheduled to 7/27/2020 [facility]

## 2020-03-31 NOTE — TELEPHONE ENCOUNTER
----- Message from Nishi Durham MD sent at 3/31/2020 10:43 AM EDT -----  I spoke to the patient  Patient would like to reschedule his colonoscopy in 3 months secondary to ongoing [place] pandemic  He reports that his symptoms have improved as well  I have informed him that should he have worsening symptoms of rectal bleeding, alternating diarrhea constipation to give us a call back  He agrees with this plan

## 2020-04-02 ENCOUNTER — TELEPHONE (OUTPATIENT)
Dept: GASTROENTEROLOGY | Facility: CLINIC | Age: 35
End: 2020-04-02

## 2020-06-18 ENCOUNTER — TELEPHONE (OUTPATIENT)
Dept: GASTROENTEROLOGY | Facility: AMBULARY SURGERY CENTER | Age: 35
End: 2020-06-18

## 2020-06-18 DIAGNOSIS — R19.8 ALTERNATING CONSTIPATION AND DIARRHEA: Primary | ICD-10-CM

## 2020-07-24 ENCOUNTER — TELEPHONE (OUTPATIENT)
Dept: GASTROENTEROLOGY | Facility: CLINIC | Age: 35
End: 2020-07-24

## 2020-07-24 NOTE — TELEPHONE ENCOUNTER
Called [facility] and spoke to [address] [place] [address] for prior authorization requirements for patient's colonoscopy  No auth required    Call ref # [id number]

## 2020-07-26 DIAGNOSIS — Z20.822 COVID-19 RULED OUT BY LABORATORY TESTING: ICD-10-CM

## 2020-07-26 PROCEDURE — U0003 INFECTIOUS AGENT DETECTION BY NUCLEIC ACID (DNA OR RNA); SEVERE ACUTE RESPIRATORY SYNDROME CORONAVIRUS 2 (SARS-COV-2) (CORONAVIRUS DISEASE [COVID-19]), AMPLIFIED PROBE TECHNIQUE, MAKING USE OF HIGH THROUGHPUT TECHNOLOGIES AS DESCRIBED BY CMS-2020-01-R: HCPCS

## 2020-07-29 ENCOUNTER — TELEPHONE (OUTPATIENT)
Dept: GASTROENTEROLOGY | Facility: AMBULARY SURGERY CENTER | Age: 35
End: 2020-07-29

## 2020-07-29 DIAGNOSIS — R19.8 ALTERNATING CONSTIPATION AND DIARRHEA: Primary | ICD-10-CM

## 2020-07-29 LAB — SARS-COV-2 RNA SPEC QL NAA+PROBE: NOT DETECTED

## 2020-07-29 NOTE — PRE-PROCEDURE INSTRUCTIONS
Pre-Surgery Instructions:   Medication Instructions    Na Sulfate-K Sulfate-Mg Sulf (Suprep Bowel Prep Kit) 17 5-3 13-1 6 GM/177ML SOLN Patient was instructed by Physician and understands  Pt to follow Dr Charo Oliva instructions   Rosa Foy

## 2020-07-29 NOTE — TELEPHONE ENCOUNTER
[address] , questioning Dulcolax and Golytely , if it needs to be brand necessary ,  Pharmacist can be reached at [phone]

## 2020-07-31 ENCOUNTER — ANESTHESIA EVENT (OUTPATIENT)
Dept: GASTROENTEROLOGY | Facility: AMBULARY SURGERY CENTER | Age: 35
End: 2020-07-31

## 2020-07-31 ENCOUNTER — ANESTHESIA (OUTPATIENT)
Dept: GASTROENTEROLOGY | Facility: AMBULARY SURGERY CENTER | Age: 35
End: 2020-07-31

## 2020-07-31 ENCOUNTER — HOSPITAL ENCOUNTER (OUTPATIENT)
Dept: GASTROENTEROLOGY | Facility: AMBULARY SURGERY CENTER | Age: 35
Setting detail: OUTPATIENT SURGERY
Discharge: HOME | End: 2020-07-31
Attending: INTERNAL MEDICINE | Admitting: INTERNAL MEDICINE
Payer: COMMERCIAL

## 2020-07-31 VITALS
DIASTOLIC BLOOD PRESSURE: 86 MMHG | BODY MASS INDEX: 28.23 KG/M2 | TEMPERATURE: 95.2 F | WEIGHT: 220 LBS | HEIGHT: 74 IN | OXYGEN SATURATION: 99 % | HEART RATE: 65 BPM | SYSTOLIC BLOOD PRESSURE: 123 MMHG | RESPIRATION RATE: 18 BRPM

## 2020-07-31 DIAGNOSIS — Z20.822 COVID-19 RULED OUT BY LABORATORY TESTING: Primary | ICD-10-CM

## 2020-07-31 DIAGNOSIS — K62.5 RECTAL BLEEDING: ICD-10-CM

## 2020-07-31 DIAGNOSIS — Z12.11 SCREENING FOR COLON CANCER: ICD-10-CM

## 2020-07-31 PROCEDURE — 88305 TISSUE EXAM BY PATHOLOGIST: CPT | Performed by: PATHOLOGY

## 2020-07-31 PROCEDURE — 45380 COLONOSCOPY AND BIOPSY: CPT | Performed by: INTERNAL MEDICINE

## 2020-07-31 RX ORDER — PROPOFOL 10 MG/ML
INJECTION, EMULSION INTRAVENOUS CONTINUOUS PRN
Status: DISCONTINUED | OUTPATIENT
Start: 2020-07-31 | End: 2020-07-31 | Stop reason: SURG

## 2020-07-31 RX ORDER — SODIUM CHLORIDE, SODIUM LACTATE, POTASSIUM CHLORIDE, CALCIUM CHLORIDE 600; 310; 30; 20 MG/100ML; MG/100ML; MG/100ML; MG/100ML
125 INJECTION, SOLUTION INTRAVENOUS CONTINUOUS
Status: CANCELLED | OUTPATIENT
Start: 2020-07-31

## 2020-07-31 RX ORDER — SODIUM CHLORIDE, SODIUM LACTATE, POTASSIUM CHLORIDE, CALCIUM CHLORIDE 600; 310; 30; 20 MG/100ML; MG/100ML; MG/100ML; MG/100ML
INJECTION, SOLUTION INTRAVENOUS CONTINUOUS PRN
Status: DISCONTINUED | OUTPATIENT
Start: 2020-07-31 | End: 2020-07-31 | Stop reason: SURG

## 2020-07-31 RX ORDER — LIDOCAINE HYDROCHLORIDE 10 MG/ML
INJECTION, SOLUTION EPIDURAL; INFILTRATION; INTRACAUDAL; PERINEURAL AS NEEDED
Status: DISCONTINUED | OUTPATIENT
Start: 2020-07-31 | End: 2020-07-31 | Stop reason: SURG

## 2020-07-31 RX ORDER — PROPOFOL 10 MG/ML
INJECTION, EMULSION INTRAVENOUS AS NEEDED
Status: DISCONTINUED | OUTPATIENT
Start: 2020-07-31 | End: 2020-07-31 | Stop reason: SURG

## 2020-07-31 RX ADMIN — PROPOFOL 40 MG: 10 INJECTION, EMULSION INTRAVENOUS at 08:19

## 2020-07-31 RX ADMIN — PROPOFOL 100 MG: 10 INJECTION, EMULSION INTRAVENOUS at 08:13

## 2020-07-31 RX ADMIN — PROPOFOL 175 MCG/KG/MIN: 10 INJECTION, EMULSION INTRAVENOUS at 08:13

## 2020-07-31 RX ADMIN — PROPOFOL 20 MG: 10 INJECTION, EMULSION INTRAVENOUS at 08:20

## 2020-07-31 RX ADMIN — SODIUM CHLORIDE, SODIUM LACTATE, POTASSIUM CHLORIDE, AND CALCIUM CHLORIDE: .6; .31; .03; .02 INJECTION, SOLUTION INTRAVENOUS at 08:01

## 2020-07-31 RX ADMIN — LIDOCAINE HYDROCHLORIDE 50 MG: 10 INJECTION, SOLUTION EPIDURAL; INFILTRATION; INTRACAUDAL; PERINEURAL at 08:13

## 2020-07-31 NOTE — H&P
History and Physical - [facility]  Iram Gibson 37 y o  male MRN: [id number]    HPI: Iram Gibson is a 37y o  year old male who presents for evaluation of alternating diarrhea and constipation with rectal bleeding  Review of Systems    Historical Information   Past Medical History:   Diagnosis Date    Hemorrhoids     Rectal bleed     Right hip pain      Past Surgical History:   Procedure Laterality Date    FOOT SURGERY Left     toe-replaced the joint of the great toe with silicone    WISDOM TOOTH EXTRACTION      x4     Social History   Social History     Substance and Sexual Activity   Alcohol Use Yes    Alcohol/week: 4 0 standard drinks    Types: 4 Cans of beer per week     Social History     Substance and Sexual Activity   Drug Use No     Social History     Tobacco Use   Smoking Status Never Smoker   Smokeless Tobacco Never Used     Family History   Problem Relation Age of Onset    Cancer Mother         breast    Hypertension Mother     Cancer Father         prostate       Meds/Allergies       (Not in a hospital admission)    No Known Allergies    Objective     /94   Pulse 71   Temp (!) 95 2 °F (35 1 °C) (Tympanic)   Resp 18   Ht 6' 2" (1 88 m)   Wt 99 8 kg (220 lb)   SpO2 100%   BMI 28 25 kg/m²       PHYSICAL EXAM    Gen: NAD  CV: RRR  CHEST: Clear  ABD: soft, NT/ND  EXT: no edema  Neuro: AAO      ASSESSMENT/PLAN:  This is a 37y o  year old male here for evaluation of rectal bleeding  PLAN:   Procedure:  Colonoscopy

## 2020-07-31 NOTE — ANESTHESIA POSTPROCEDURE EVALUATION
Post-Op Assessment Note    CV Status:  Stable  Pain Score: 0    Pain management: adequate     Mental Status:  Alert and awake   Hydration Status:  Euvolemic   PONV Controlled:  Controlled   Airway Patency:  Patent   Post Op Vitals Reviewed: Yes      Staff: Anesthesiologist           BP     Temp     Pulse     Resp      SpO2

## 2020-07-31 NOTE — ANESTHESIA PREPROCEDURE EVALUATION
Review of Systems/Medical History  Patient summary reviewed  Chart reviewed  No history of anesthetic complications     Cardiovascular  Negative cardio ROS Exercise tolerance (METS): >4,     Pulmonary  Negative pulmonary ROS        GI/Hepatic    Bowel prep       Negative  ROS        Endo/Other  Negative endo/other ROS      GYN  Negative gynecology ROS          Hematology  Negative hematology ROS      Musculoskeletal  Negative musculoskeletal ROS        Neurology  Negative neurology ROS      Psychology   Negative psychology ROS              Physical Exam    Airway    Mallampati score: III  TM Distance: >3 FB  Neck ROM: full     Dental   No notable dental hx     Cardiovascular  Comment: Negative ROS, Rhythm: regular, Rate: normal, Cardiovascular exam normal    Pulmonary  Pulmonary exam normal Breath sounds clear to auscultation,     Other Findings        Anesthesia Plan  ASA Score- 1     Anesthesia Type- IV sedation with anesthesia with ASA Monitors  Additional Monitors:   Airway Plan:         Plan Factors-  Patient did not smoke on day of surgery  Induction- intravenous  Postoperative Plan-     Informed Consent- Anesthetic plan and risks discussed with patient  I personally reviewed this patient with the CRNA  Discussed and agreed on the Anesthesia Plan with the CRNA  Soumya Loredo

## 2020-09-18 ENCOUNTER — OFFICE VISIT (OUTPATIENT)
Dept: FAMILY MEDICINE CLINIC | Facility: CLINIC | Age: 35
End: 2020-09-18
Payer: COMMERCIAL

## 2020-09-18 VITALS
BODY MASS INDEX: 29.13 KG/M2 | TEMPERATURE: 96.2 F | HEIGHT: 74 IN | WEIGHT: 227 LBS | SYSTOLIC BLOOD PRESSURE: 110 MMHG | OXYGEN SATURATION: 98 % | DIASTOLIC BLOOD PRESSURE: 72 MMHG | HEART RATE: 73 BPM

## 2020-09-18 DIAGNOSIS — Z86.19 HISTORY OF ONYCHOMYCOSIS: Primary | ICD-10-CM

## 2020-09-18 DIAGNOSIS — Z13.1 SCREENING FOR DIABETES MELLITUS: ICD-10-CM

## 2020-09-18 DIAGNOSIS — Z11.4 SCREENING FOR HIV (HUMAN IMMUNODEFICIENCY VIRUS): ICD-10-CM

## 2020-09-18 PROCEDURE — 99214 OFFICE O/P EST MOD 30 MIN: CPT | Performed by: FAMILY MEDICINE

## 2020-09-18 NOTE — ASSESSMENT & PLAN NOTE
Patient with h/o onychomycosis of R hallux, treated in the past successfully with 12 weeks of oral antifungals  No evidence of onychomycosis currently  Discussed preventative measures with patient, including changing socks frequently during the day, avoiding moisture, making sure to fully dry feet and between toes after showering, and applying tea-tree oil based moisturizer to feet  Continue to monitor  Follow up in 1 month for annual well visit   Labs ordered for diabetes screening and HIV

## 2020-09-18 NOTE — PROGRESS NOTES
Family Medicine Follow-Up Office Visit  Mai Cervantes 37 y o  male   MRN: [id number] : 1985  ENCOUNTER: 2020 4:28 PM    Assessment and Plan   History of onychomycosis  Patient with h/o onychomycosis of R hallux, treated in the past successfully with 12 weeks of oral antifungals  No evidence of onychomycosis currently  Discussed preventative measures with patient, including changing socks frequently during the day, avoiding moisture, making sure to fully dry feet and between toes after showering, and applying tea-tree oil based moisturizer to feet  Continue to monitor  Follow up in 1 month for annual well visit  Labs ordered for diabetes screening and HIV      Chief Complaint     Chief Complaint   Patient presents with    Nail Problem       History of Present Illness   Mai Cervantes is a 37y o -year-old male who presents today for concerns for toenail fungus of the R great toe  Has history of onychomycosis several years ago of the same toe, treated successfully with 12 weeks of oral antifungals  He is concerned it is recurring since he noticed some darkening of the nail bed a few weeks ago after having an ingrown toenaol  Does not notice any thickening of the toenail  Toenail does not bother him otherwise  Review of Systems   Review of Systems   Constitutional: Negative for chills and fever  Respiratory: Negative for cough and shortness of breath  Cardiovascular: Negative for chest pain  Musculoskeletal: Negative for arthralgias, back pain, gait problem, joint swelling and myalgias  Skin: Negative for color change, pallor, rash and wound  Neurological: Negative for weakness and numbness  Active Problem List     Patient Active Problem List   Diagnosis    Dermatitis    Pain of right hip joint    History of onychomycosis       Past Medical History, Past Surgical History, Family History, and Social History were reviewed and updated today as appropriate      Objective   /72 Pulse 73   Temp (!) 96 2 °F (35 7 °C)   Ht 6' 2" (1 88 m)   Wt 103 kg (227 lb)   SpO2 98%   BMI 29 15 kg/m²     Physical Exam  Vitals signs and nursing note reviewed  Constitutional:       General: He is not in acute distress  Appearance: Normal appearance  He is normal weight  He is not ill-appearing, toxic-appearing or diaphoretic  HENT:      Head: Normocephalic and atraumatic  Right Ear: External ear normal       Left Ear: External ear normal    Eyes:      General: No scleral icterus  Right eye: No discharge  Left eye: No discharge  Conjunctiva/sclera: Conjunctivae normal    Cardiovascular:      Pulses: Normal pulses  Comments: 2+ dorsalis pedis and posterior tibial pulses bilaterally   Pulmonary:      Effort: Pulmonary effort is normal  No respiratory distress  Musculoskeletal:      Right lower leg: No edema  Left lower leg: No edema  Skin:     General: Skin is warm  Capillary Refill: Capillary refill takes less than 2 seconds  Coloration: Skin is not jaundiced  Findings: No bruising, lesion or rash  Comments: Skin of feet intact - no cracking or lesions  No maceration between metatarsals  Some hyperpigmentation of the toenail of the R hallux, which matches the second metatarsal of the R foot  No nail bed thickening of the hallux of the R toe, good homogenous texture  Neurological:      Mental Status: He is alert     Psychiatric:         Mood and Affect: Mood normal          Behavior: Behavior normal          Pertinent Laboratory/Diagnostic Studies:  No results found for: GLUCOSE, BUN, CREATININE, CALCIUM, NA, K, CO2, CL  No results found for: ALT, AST, GGT, ALKPHOS, BILITOT    Lab Results   Component Value Date    WBC 5 31 03/02/2020    HGB 13 8 03/02/2020    HCT 39 6 03/02/2020    MCV 85 03/02/2020     03/02/2020       No results found for: TSH    No results found for: CHOL  No results found for: TRIG  No results found for: HDL  No results found for: LDLCALC  No results found for: HGBA1C    Results for orders placed or performed during the hospital encounter of 07/31/20   Tissue Exam   Result Value Ref Range    Case Report       Surgical Pathology Report                         Case: [id number]                                   Authorizing Provider:  Brenda Vazquez MD       Collected:           07/31/2020 0825              Ordering Location:     [facility]   Received:            07/31/2020 [facility]                                                                       Pathologist:           Jessica Maynard MD                                                         Specimen:    Colon, random colon bxs- r/o microscopic colitis                                           Final Diagnosis       A  Random colon biopsy:  - Benign colonic mucosa with nonspecific reactive changes   - No thickened basement membranes or intraepithelial lymphocytosis to suggest microscopic colitis (i e  collagenous colitis or lymphocytic colitis, resp  )   - No active or chronic colitis  - No glandular dysplasia and no evidence of malignancy  Additional Information       All reported additional testing was performed with appropriately reactive controls  These tests were developed and their performance characteristics determined by [facility] or appropriate performing facility, though some tests may be performed on tissues which have not been validated for performance characteristics (such as staining performed on alcohol exposed cell blocks and decalcified tissues)  Results should be interpreted with caution and in the context of the patients clinical condition  These tests may not be cleared or approved by the [place]  Food and Drug Administration, though the FDA has determined that such clearance or approval is not necessary   These tests are used for clinical purposes and they should not be regarded as investigational or for research  This laboratory has been approved by CLIA 88, designated as a high-complexity laboratory and is qualified to perform these tests  Lynn Verma Description       A  The specimen is received in formalin, labeled with the patient's name and hospital number, and is designated "random colon biopsy, rule out microscopic colitis  The specimen consists of 4 tan pink friable soft tissue fragments measuring 0 2-0 3 cm in greatest dimensions  The specimen is entirely submitted in a screened cassette  Note: The estimated total formalin fixation time based upon information provided by the submitting clinician and the standard processing schedule is under 72 hours  VAlvarez                 Orders Placed This Encounter   Procedures    Basic metabolic panel    HIV 1/2 ANTIGEN/ANTIBODY (4TH GENERATION) W REFLEX [facility]         Current Medications     Current Outpatient Medications   Medication Sig Dispense Refill    bisacodyl (DULCOLAX) 5 mg EC tablet Take 2 tablets (10 mg total) by mouth once for 1 dose (Patient not taking: Reported on 9/18/2020) 2 tablet 0    polyethylene glycol (GOLYTELY) 4000 mL solution Take 4,000 mL by mouth once for 1 dose (Patient not taking: Reported on 9/18/2020) 4000 mL 0     No current facility-administered medications for this visit          ALLERGIES:  No Known Allergies    Health Maintenance     Health Maintenance   Topic Date Due    HIV Screening  07/04/2002    BMI: Followup Plan  07/04/2005    Annual Physical  07/04/2005    Hepatitis B Vaccine (3 of 3 - 3-dose primary series) 09/08/2008    DTaP,Tdap,and Td Vaccines (6 - Td) 07/17/2017    Influenza Vaccine  07/01/2020    Depression Screening PHQ  01/07/2021    BMI: Adult  09/18/2021    HIB Vaccine  Completed    IPV Vaccine  Completed    Pneumococcal Vaccine: Pediatrics (0 to 5 Years) and At-Risk Patients (6 to 59 Years)  Aged Out    [facility] Vaccine  [facility] Meningococcal [facility] Vaccine  Aged Out    HPV Vaccine  Aged Out     Immunization History   Administered Date(s) Administered    DTaP 1987, 1987, 09/13/1988, 09/12/1990, 10/20/1990    Hep B, Adolescent or Pediatric 08/24/2005, 09/24/2005, 07/14/2008    HiB 12/21/1989    IPV 1987, 1987, 12/21/1989, 10/20/1992    MMR 05/25/1989, 08/26/1991    Meningococcal Polysaccharide (MPSV4) 07/17/2007    Tdap 07/17/2007    Tuberculin Skin Test-PPD Intradermal 06/28/2007         Shira Luciano DO   [address]  9/18/2020  4:28 PM    Parts of this note were dictated using "[employer]" dictation software and may have sounds-like errors due to variation in pronunciation

## 2020-09-18 NOTE — PATIENT INSTRUCTIONS
-Change socks multiple times throughout the day  -Make sure to keep feet dry after taking a shower  -You can apply tea-tree oil based lotions on your feet   -All of these help prevent foot and toe fungus, but I do not appreciate any at this time    -Please get labs done before your next visit in 1 month  -Get labs done on empty stomach- first thing in the morning before you eat

## 2021-03-12 ENCOUNTER — OFFICE VISIT (OUTPATIENT)
Dept: FAMILY MEDICINE CLINIC | Facility: CLINIC | Age: 36
End: 2021-03-12
Payer: COMMERCIAL

## 2021-03-12 VITALS
TEMPERATURE: 97.7 F | DIASTOLIC BLOOD PRESSURE: 76 MMHG | RESPIRATION RATE: 20 BRPM | OXYGEN SATURATION: 97 % | BODY MASS INDEX: 29.56 KG/M2 | SYSTOLIC BLOOD PRESSURE: 116 MMHG | HEART RATE: 74 BPM | WEIGHT: 230.2 LBS

## 2021-03-12 DIAGNOSIS — G89.29 CHRONIC PAIN OF LEFT ANKLE: ICD-10-CM

## 2021-03-12 DIAGNOSIS — M62.838 TRAPEZIUS MUSCLE SPASM: ICD-10-CM

## 2021-03-12 DIAGNOSIS — M25.572 CHRONIC PAIN OF LEFT ANKLE: ICD-10-CM

## 2021-03-12 DIAGNOSIS — M40.04 POSTURAL KYPHOSIS OF THORACIC REGION: Primary | ICD-10-CM

## 2021-03-12 PROCEDURE — 3725F SCREEN DEPRESSION PERFORMED: CPT | Performed by: FAMILY MEDICINE

## 2021-03-12 PROCEDURE — 99213 OFFICE O/P EST LOW 20 MIN: CPT | Performed by: FAMILY MEDICINE

## 2021-03-12 PROCEDURE — 1036F TOBACCO NON-USER: CPT | Performed by: FAMILY MEDICINE

## 2021-03-12 NOTE — PROGRESS NOTES
Assessment/Plan     Postural kyphosis of thoracic region    Secondary to poor posture advised physical therapy for neuromodulation to improve posture,  Also due to these poor mechanics he has a very hypertonic trapezius muscle  -  He may also return for OMT    Trapezius muscle spasm  -  See plan above    Chronic pain of left ankle  -  Likely secondary to fascial restriction from previous ankle sprains in which he has limited dorsiflexion  -  Can give physical therapy exercises as well as OMT treatment  -  Will also refer to podiatry per patient request     Diagnoses and all orders for this visit:    Postural kyphosis of thoracic region  -     Ambulatory referral to Physical Therapy; Future    Trapezius muscle spasm  -     Ambulatory referral to Physical Therapy; Future    Chronic pain of left ankle  -     Ambulatory referral to Physical Therapy; Future  -     Ambulatory referral to Podiatry; Future         Subjective     Chief Complaint:  Back pain    HPI:    This is a 53-year-old male who presents for thoracic pain  He reports is a chronic problem  He denies any injury  He reports that he feels like his muscles are always tight  He also reports that he often twist his back and causes it to crack which relieves some of his pain  He also reports ankle pain with walking at times that is intermittent  He did have a sprain to the ankle in the past    He denies any inversion or eversion sprains recently  He denies joint is unstable  When the pain is severe in his thoracic back he takes ibuprofen with relief  The following portions of the patient's history were reviewed and updated as appropriate: allergies, current medications, past family history, past medical history, past social history, past surgical history and problem list     Review of Systems  Review of Systems   Constitutional: Negative for fatigue and fever  HENT: Negative for congestion      Respiratory: Negative for cough and shortness of breath  Cardiovascular: Negative for chest pain and palpitations  Gastrointestinal: Negative for abdominal pain  Musculoskeletal:        As noted in HPI    Neurological: Negative for headaches  Objective   Vitals:    03/12/21 1614   BP: 116/76   Pulse: 74   Resp: 20   Temp: 97 7 °F (36 5 °C)   SpO2: 97%       Physical Exam  Constitutional:       Appearance: Normal appearance  He is not ill-appearing  Neck:      Musculoskeletal: Neck supple  Musculoskeletal:      Comments: Left ankle has decreased range of motion in dorsiflexion compared to right,  no point tenderness to palpation  Thoracic spine T 5 to T12 has increased kyphosis  He also has some trigger points between his thoracic spine and scapula  He also has hypertonic paraspinal muscles  He has significant hypertonicity of bilateral trapezius muscle   Neurological:      Mental Status: He is oriented to person, place, and time  Psychiatric:         Mood and Affect: Mood normal          Lab Results: I have reviewed all the lab results

## 2021-03-12 NOTE — ASSESSMENT & PLAN NOTE
-  Likely secondary to fascial restriction from previous ankle sprains in which he has limited dorsiflexion  -  Can give physical therapy exercises as well as OMT treatment  -  Will also refer to podiatry per patient request

## 2021-03-12 NOTE — ASSESSMENT & PLAN NOTE
Secondary to poor posture advised physical therapy for neuromodulation to improve posture,  Also due to these poor mechanics he has a very hypertonic trapezius muscle  -  He may also return for OMT

## 2021-04-05 ENCOUNTER — PROCEDURE VISIT (OUTPATIENT)
Dept: FAMILY MEDICINE CLINIC | Facility: CLINIC | Age: 36
End: 2021-04-05
Payer: COMMERCIAL

## 2021-04-05 DIAGNOSIS — M62.838 TRAPEZIUS MUSCLE SPASM: ICD-10-CM

## 2021-04-05 DIAGNOSIS — M40.04 POSTURAL KYPHOSIS OF THORACIC REGION: Primary | ICD-10-CM

## 2021-04-05 DIAGNOSIS — M99.01 SOMATIC DYSFUNCTION OF CERVICAL REGION: ICD-10-CM

## 2021-04-05 DIAGNOSIS — M99.00 SOMATIC DYSFUNCTION OF HEAD REGION: ICD-10-CM

## 2021-04-05 DIAGNOSIS — M99.02 SOMATIC DYSFUNCTION OF THORACIC REGION: ICD-10-CM

## 2021-04-05 PROCEDURE — 98926 OSTEOPATH MANJ 3-4 REGIONS: CPT | Performed by: FAMILY MEDICINE

## 2021-04-05 NOTE — PROGRESS NOTES
The Assessment/Plan     This is a 37 y o  male who presents for OMT follow-up for:  1  Postural kyphosis of thoracic region     2  Trapezius muscle spasm     3  Somatic dysfunction of head region     4  Somatic dysfunction of cervical region     5  Somatic dysfunction of thoracic region          1  Patient tolerated OMT well for the above problems,  advised patient to drink fluids and can use NSAID for soreness after treatment     2  OMT Follow up in 2 weeks  Subjective     Mai Cervantes is a 37 y o  male and is here for a OMT initial evaluation and treatment  The patient reports upper left back pain which has been constant for a couple years  Not sure what triggers or makes it worse  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 no  Has the patient completed physical therapy for this condition? no  Did Patient symptoms improve from last OMT appointment? N/A first appointment    The following portions of the patient's history were reviewed and updated as appropriate: allergies, current medications, past family history, past medical history, past social history, past surgical history and problem list     Review of Systems  Review of Systems   Constitutional: Negative for chills and fever  HENT: Negative for rhinorrhea and sinus pain  Eyes: Negative for visual disturbance  Respiratory: Negative for shortness of breath  Cardiovascular: Negative for chest pain  Gastrointestinal: Negative for abdominal pain, constipation, diarrhea, nausea and vomiting  Genitourinary: Negative for difficulty urinating     Musculoskeletal: Positive for back pain and myalgias  Negative for gait problem and neck pain  Skin: Negative for rash  Neurological: Negative for dizziness and headaches  Objective     OMT Exam     OMT  Performed by: Alex Dyer, DO  Authorized by: Alex Dyer, DO     Verbal consent obtained?: Yes    Risks and benefits: Risks, benefits and alternatives were discussed    Consent given by:  Patient  Procedure Details:     Region evaluated and treated:  Head, Cervical and Thoracic T5 - T9    Head Details:     Examination Method:  Tissue Texture Change, Stability, Laxity, Effusions, Tone, Asymmetry, Misalignment, Crepitation, Defects, Masses, Tenderness, Pain, Range of Motion, Contracture, Passive and Active    Severity:  Mild    Osteopathic Findings:  Suboccipital strain    Treatment Method:  Myofascial Release Treatment    Response:  Improved - The somatic dysfunction is improved but not completely resolved  Cervical Details:     Examination Method:  Tissue Texture Change, Stability, Laxity, Effusions, Tone, Range of Motion, Contracture, Passive, Asymmetry, Misalignment, Crepitation, Defects, Masses, Tenderness, Pain and Active    Severity:  Moderate    Osteopathic Findings:  Upper trapezius hypertonicity, Cervical paraspinal hypertonicity, C2-7 SRRR    Treatment Method:  Muscle Energy Treatment, Myofascial Release Treatment and Soft Tissue Treatment    Response:  Improved - The somatic dysfunction is improved but not completely resolved      Thoracic T5 - T9 details:     Examination Method:  Tissue Texture Change, Stability, Laxity, Effusions, Tone, Range of Motion, Contracture, Passive, Asymmetry, Misalignment, Crepitation, Defects, Masses, Tenderness, Pain and Active    Severity:  Moderate    Osteopathic Findings:  Thoracic kyphosis, T5SLRR, Anterior head carriage, L>R lower trapezius hypertonicity, Rhomboid trigger points B/L L >R    Treatment Method:  Myofascial Release Treatment, Soft Tissue Treatment, Muscle Energy Treatment and High Velocity, Low Amplitude Treatment    Response:  Improved - The somatic dysfunction is improved but not completely resolved  Total Regions Treated:  3  Attending provider present in exam room for procedure:  Yes

## 2021-05-11 ENCOUNTER — PROCEDURE VISIT (OUTPATIENT)
Dept: FAMILY MEDICINE CLINIC | Facility: CLINIC | Age: 36
End: 2021-05-11
Payer: COMMERCIAL

## 2021-05-11 DIAGNOSIS — M99.02 SOMATIC DYSFUNCTION OF THORACIC REGION: ICD-10-CM

## 2021-05-11 DIAGNOSIS — M79.18 RHOMBOID MUSCLE PAIN: ICD-10-CM

## 2021-05-11 DIAGNOSIS — M62.838 TRAPEZIUS MUSCLE SPASM: Primary | ICD-10-CM

## 2021-05-11 DIAGNOSIS — M99.01 SOMATIC DYSFUNCTION OF CERVICAL REGION: ICD-10-CM

## 2021-05-11 DIAGNOSIS — M99.00 SOMATIC DYSFUNCTION OF HEAD REGION: ICD-10-CM

## 2021-05-11 PROCEDURE — 1036F TOBACCO NON-USER: CPT | Performed by: FAMILY MEDICINE

## 2021-05-11 PROCEDURE — 99213 OFFICE O/P EST LOW 20 MIN: CPT | Performed by: FAMILY MEDICINE

## 2021-05-11 NOTE — PATIENT INSTRUCTIONS
Upper Back Exercises   AMBULATORY CARE:   Upper back exercises  help heal and strengthen your back muscles and prevent another injury  Ask your healthcare provider if you need to see a physical therapist for more advanced exercises  · Do the exercises on a mat or firm surface  (not on a bed) to support your spine  · Move slowly and smoothly  Avoid fast or jerky motions  · Breathe normally  Do not hold your breath  · Stop if you feel pain  It is normal to feel some discomfort at first  Regular exercise will help decrease your discomfort over time  Seek care immediately if:   · You have severe pain that prevents you from moving  Contact your healthcare provider if:   · Your pain becomes worse  · You have new pain  · You have questions or concerns about your condition, care, or exercise program     Perform upper back exercises safely:  Ask your healthcare provider which of the following exercises are best for you and how often to do them  · Head rolls:  Sit in a chair or stand  Bring your chin toward your chest and roll your head to the right  Your ear should be over your shoulder  Hold this position for 5 seconds  Roll your head back toward your chest and to the left  Your ear should be over your left shoulder  Hold this position for 5 seconds  Next, roll your head back slowly in a clockwise [place] and repeat 3 times  Do 3 sets of head rolls  · Scapular squeeze:  Sit or stand with your arms at your sides  Squeeze your shoulder blades together and hold for 3 seconds  Relax and repeat 3 times  · Pectoralis stretch:   a doorway  Lift your hands and place them on each side of the door frame or wall slightly higher than your head  Lean forward slowly until you feel a gentle stretch  Hold for 15 seconds  Repeat 3 times, or as directed  · Cat and camel exercise:  Place your hands and knees on the floor   Arch your back upward toward the ceiling and lower your head  Round out your spine as much as you can  Hold for 5 seconds  Lift your head upward and push your chest downward toward the floor  Hold for 5 seconds  Do 3 sets or as directed  · Bird dog:  Place your hands and knees on the floor  Keep your wrists directly below your shoulders and your knees directly below your hips  Pull your belly button in toward your spine  Do not flatten or arch your back  Tighten your abdominal muscles  Raise one arm straight out so that it is aligned with your head  Next, raise the leg opposite your arm  Hold this position for 15 seconds  Lower your arm and leg slowly and change sides  Do 5 sets  © Copyright [address] Information is for End User's use only and may not be sold, redistributed or otherwise used for commercial purposes  All illustrations and images included in CareNotes® are the copyrighted property of [employer]  or [facility] Smita [employer]   The above information is an  only  It is not intended as medical advice for individual conditions or treatments  Talk to your doctor, nurse or pharmacist before following any medical regimen to see if it is safe and effective for you

## 2021-05-11 NOTE — PROGRESS NOTES
The Assessment/Plan     This is a 37 y o  male who presents for OMT follow up visit for:  1  Trapezius muscle spasm     2  Rhomboid muscle pain     3  Somatic dysfunction of thoracic region     4  Somatic dysfunction of cervical region     5  Somatic dysfunction of head region          1  Patient tolerated OMT well for the above problems,  advised patient to drink fluids and can use NSAID for soreness after treatment  Provided handout on stretches and reviewed with patient  Emphasized neck stretches and cat-camel stretch and other similar stretches to help out with rhomboid hypertonicity  PT referral placed  2  OMT Follow up in 2-4 weeks  Subjective     Anusha Arnold is a 37 y o  male and is here for a OMT follow up visit  Addressed trapezius muscle spasm - note on 4/5/21:  left back pain which has been constant for a couple years  Not sure what triggers or makes it worse  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 no  Has the patient completed physical therapy for this condition? no  Did Patient symptoms improve from last OMT appointment?  Yes on the day of, then worsened, now back to baseline    The following portions of the patient's history were reviewed and updated as appropriate: allergies, current medications, past family history, past medical history, past social history, past surgical history and problem list     Review of Systems  Review of Systems   Constitutional: Negative for chills and fever  Respiratory: Negative for cough and shortness of breath  Cardiovascular: Negative for chest pain and leg swelling  Gastrointestinal: Negative for abdominal pain, constipation, diarrhea, nausea and vomiting  Genitourinary: Negative for dysuria  Musculoskeletal: Positive for back pain  Negative for neck pain  Neurological: Negative for weakness and numbness  Objective     OMT Exam     OMT  Performed by: Otis Rice DO  Authorized by: Otis Rice,      Verbal consent obtained?: Yes    Risks and benefits: Risks, benefits and alternatives were discussed    Patient states understanding of procedure being performed: Yes    Patient identity confirmed:  Verbally with patient  Procedure Details:     Region evaluated and treated:  Head, Cervical, Thoracic T1 - T4 and Thoracic T5 - T9    Head Details:     Examination Method:  Asymmetry, Misalignment, Crepitation, Defects, Masses    Severity:  Mild    Osteopathic Findings:  OA restriction    Treatment Method:  Direct Treatment and Soft Tissue Treatment    Response:  Improved - The somatic dysfunction is improved but not completely resolved  Cervical Details:     Examination Method:  Range of Motion, Contracture    Severity:  Mild    Osteopathic Findings:  SCM muscle hypertonicity ; restricted ROM    Treatment Method:  Soft Tissue Treatment and Muscle Energy Treatment    Response:  Improved - The somatic dysfunction is improved but not completely resolved      Thoracic T1 - T4 details:     Examination Method:  Tenderness, Pain and Tissue Texture Change, Stability, Laxity, Effusions, Tone    Severity:  Moderate    Osteopathic Findings:  Hypertonicity of trapezius muscles and rhomboid and paraspinal muscles  Tender point of trapezius in muscle belly bilatearlly    Treatment Method:  Counterstrain Treatment, Direct Treatment, Muscle Energy Treatment and Soft Tissue Treatment    Response:  Improved    Thoracic T5 - T9 details: Examination Method:  Range of Motion, Contracture and Tenderness, Pain    Osteopathic Findings:  Rhomboid and paraspinal muscle contracture and hypertonicity    Treatment Method:  Direct Treatment, Counterstrain Treatment, Muscle Energy Treatment and Soft Tissue Treatment    Response:  Improved - The somatic dysfunction is improved but not completely resolved      Total Regions Treated:  4         Signature: Mishel Leonardo DO, [address], PGY-2  05/11/21

## 2021-06-04 ENCOUNTER — PROCEDURE VISIT (OUTPATIENT)
Dept: FAMILY MEDICINE CLINIC | Facility: CLINIC | Age: 36
End: 2021-06-04
Payer: COMMERCIAL

## 2021-06-04 DIAGNOSIS — M99.02 SOMATIC DYSFUNCTION OF THORACIC REGION: ICD-10-CM

## 2021-06-04 DIAGNOSIS — M99.01 SOMATIC DYSFUNCTION OF CERVICAL REGION: ICD-10-CM

## 2021-06-04 DIAGNOSIS — M99.00 SOMATIC DYSFUNCTION OF HEAD REGION: ICD-10-CM

## 2021-06-04 DIAGNOSIS — M79.18 RHOMBOID MUSCLE PAIN: ICD-10-CM

## 2021-06-04 DIAGNOSIS — M62.838 TRAPEZIUS MUSCLE SPASM: Primary | ICD-10-CM

## 2021-06-04 PROCEDURE — 99213 OFFICE O/P EST LOW 20 MIN: CPT | Performed by: FAMILY MEDICINE

## 2021-06-04 NOTE — PROGRESS NOTES
The Assessment/Plan     This is a 37 y o  male who presents for OMT follow-up for:  1  Trapezius muscle spasm  Ambulatory referral to [facility]   2  Rhomboid muscle pain  Ambulatory referral to [facility]   3  Somatic dysfunction of thoracic region     4  Somatic dysfunction of cervical region     5  Somatic dysfunction of head region      Overall clinically improving    1  Patient tolerated OMT well for the above problems,  advised patient to drink fluids and can use NSAID for soreness after treatment  Referral placed to PT and provided handout with list of PT offices through [address] [facility]  Will likely go to PT in [place]  Recommended he continue stretching    2  OMT Follow up in 4-6 weeks  Subjective     Anusha Arnold is a 37 y o  male and is here for a OMT follow up  The patient reports improvement in upper neck pain  Referral placed but he did not go because he was under the impression that he could do OMT or PT  Does stretches daily at work  Feels like stretches help  No pain now; pain is a between 1-5/10 in intensity, but 5/10 is rare  Is the patient taking Pain medication? no  Has the patient completed physical therapy for this condition? no  Did Patient symptoms improve from last OMT appointment? yes    The following portions of the patient's history were reviewed and updated as appropriate: allergies, current medications, past family history, past medical history, past social history, past surgical history and problem list     Review of Systems  Review of Systems   Constitutional: Negative for chills and fever  Respiratory: Negative for cough and shortness of breath  Gastrointestinal: Negative for constipation, diarrhea, nausea and vomiting  Musculoskeletal: Positive for back pain  Negative for neck pain  Neurological: Negative for weakness and numbness         Objective     OMT Exam     OMT  Performed by: Bola Villalobos DO  Authorized by: Bola Villalobos DO     Verbal consent obtained?: Yes    Risks and benefits: Risks, benefits and alternatives were discussed    Patient states understanding of procedure being performed: Yes    Time out: Immediately prior to the procedure a time out was called      Procedure Details:     Region evaluated and treated:  Head, Thoracic T1 - T4, Cervical and Thoracic T5 - T9    Head Details:     Examination Method:  Range of Motion, Contracture    Severity:  Mild    Osteopathic Findings:  Reduced ROM of OA    Treatment Method:  Articulatory Treatment and Soft Tissue Treatment    Response:  Improved - The somatic dysfunction is improved but not completely resolved  Cervical Details:     Examination Method:  Tissue Texture Change, Stability, Laxity, Effusions, Tone and Asymmetry, Misalignment, Crepitation, Defects, Masses    Osteopathic Findings:  Hypertonicity of the paraspinal muscles  ESLRR    Treatment Method:  Soft Tissue Treatment and Muscle Energy Treatment    Response:  Improved - The somatic dysfunction is improved but not completely resolved  Thoracic T1 - T4 details:     Examination Method:  Tissue Texture Change, Stability, Laxity, Effusions, Tone, Range of Motion, Contracture and Tenderness, Pain    Osteopathic Findings:  Hypertonicity of trapezius muscles bilaterally  Tenderpoints in mid muscle belly bilaterally    Treatment Method:  Counterstrain Treatment, Muscle Energy Treatment and Soft Tissue Treatment    Response:  Improved    Thoracic T5 - T9 details:     Examination Method:  Tenderness, Pain and Tissue Texture Change, Stability, Laxity, Effusions, Tone    Severity:  Moderate    Osteopathic Findings:  Hypertonicity in the rhomboids and paraspinals  Slightly tender to palpation    Treatment Method:  Direct Treatment and Soft Tissue Treatment    Response:  Improved - The somatic dysfunction is improved but not completely resolved  Total Regions Treated:  4  Attending provider present in exam room for procedure:  No

## 2022-04-21 ENCOUNTER — TELEPHONE (OUTPATIENT)
Dept: FAMILY MEDICINE CLINIC | Facility: CLINIC | Age: 37
End: 2022-04-21

## 2022-04-21 NOTE — TELEPHONE ENCOUNTER
Dwayne Cohen called in because he would like to get a whopping cough vaccine   His Primary Care Physician is located in [place] and we are located in [address] [place] [address] that he would need to get the vaccine done with his primary care physician in [place] and that we are unable to administer the shot for him     Patient is under the impression that he can go to any [place] [address] facility to receive vaccination   Attempted to explain to him that that is not how it works     Unfortunately     Patient hung up before I was able to advise that he can try an  Urgent care if he is unable to get in with his primary facility

## 2022-04-26 ENCOUNTER — APPOINTMENT (OUTPATIENT)
Dept: LAB | Facility: HOSPITAL | Age: 37
End: 2022-04-26
Payer: COMMERCIAL

## 2022-04-26 DIAGNOSIS — H20.019 PRIMARY IRIDOCYCLITIS, UNSPECIFIED LATERALITY: ICD-10-CM

## 2022-04-26 LAB
CRP SERPL QL: <3 MG/L
ERYTHROCYTE [SEDIMENTATION RATE] IN BLOOD: 4 MM/HOUR (ref 0–14)

## 2022-04-26 PROCEDURE — 82164 ANGIOTENSIN I ENZYME TEST: CPT

## 2022-04-26 PROCEDURE — 86430 RHEUMATOID FACTOR TEST QUAL: CPT

## 2022-04-26 PROCEDURE — 85705 THROMBOPLASTIN INHIBITION: CPT

## 2022-04-26 PROCEDURE — 81381 HLA I TYPING 1 ALLELE HR: CPT

## 2022-04-26 PROCEDURE — 81241 F5 GENE: CPT

## 2022-04-26 PROCEDURE — 86140 C-REACTIVE PROTEIN: CPT

## 2022-04-26 PROCEDURE — 86147 CARDIOLIPIN ANTIBODY EA IG: CPT

## 2022-04-26 PROCEDURE — 86618 LYME DISEASE ANTIBODY: CPT

## 2022-04-26 PROCEDURE — 36415 COLL VENOUS BLD VENIPUNCTURE: CPT

## 2022-04-26 PROCEDURE — 85670 THROMBIN TIME PLASMA: CPT

## 2022-04-26 PROCEDURE — 86146 BETA-2 GLYCOPROTEIN ANTIBODY: CPT

## 2022-04-26 PROCEDURE — 85652 RBC SED RATE AUTOMATED: CPT

## 2022-04-26 PROCEDURE — 86592 SYPHILIS TEST NON-TREP QUAL: CPT

## 2022-04-26 PROCEDURE — 81374 HLA I TYPING 1 ANTIGEN LR: CPT

## 2022-04-26 PROCEDURE — 85306 CLOT INHIBIT PROT S FREE: CPT

## 2022-04-26 PROCEDURE — 85732 THROMBOPLASTIN TIME PARTIAL: CPT

## 2022-04-26 PROCEDURE — 81240 F2 GENE: CPT

## 2022-04-26 PROCEDURE — 85613 RUSSELL VIPER VENOM DILUTED: CPT

## 2022-04-26 PROCEDURE — 85300 ANTITHROMBIN III ACTIVITY: CPT

## 2022-04-26 PROCEDURE — 86038 ANTINUCLEAR ANTIBODIES: CPT

## 2022-04-26 PROCEDURE — 85303 CLOT INHIBIT PROT C ACTIVITY: CPT

## 2022-04-26 PROCEDURE — 85305 CLOT INHIBIT PROT S TOTAL: CPT

## 2022-04-27 LAB
ACE SERPL-CCNC: 29 U/L (ref 14–82)
ANA TITR SER IF: NEGATIVE {TITER}
B BURGDOR IGG+IGM SER-ACNC: 23
DEPRECATED AT III PPP: 97 % OF NORMAL (ref 92–136)
RHEUMATOID FACT SER QL LA: NEGATIVE
RPR SER QL: NORMAL

## 2022-04-28 LAB
APTT SCREEN TO CONFIRM RATIO: 1.01 RATIO (ref 0–1.34)
CONFIRM APTT/NORMAL: 31.1 SEC (ref 0–47.6)
LA PPP-IMP: NORMAL
PROT C AG ACT/NOR PPP IA: 132 % OF NORMAL (ref 60–150)
PROT S ACT/NOR PPP: 101 % (ref 61–136)
PROT S ACT/NOR PPP: 116 % (ref 71–117)
PROT S PPP-ACNC: 78 % (ref 60–150)
SCREEN APTT: 36.7 SEC (ref 0–51.9)
SCREEN DRVVT: 35 SEC (ref 0–47)
THROMBIN TIME: 18 SEC (ref 0–23)

## 2022-04-29 LAB
B2 GLYCOPROT1 IGA SERPL IA-ACNC: 1.1
B2 GLYCOPROT1 IGG SERPL IA-ACNC: <0.6
B2 GLYCOPROT1 IGM SERPL IA-ACNC: <2.9
CARDIOLIPIN IGA SER IA-ACNC: 1.6
CARDIOLIPIN IGG SER IA-ACNC: 0.6
CARDIOLIPIN IGM SER IA-ACNC: 1.9

## 2022-05-02 ENCOUNTER — OFFICE VISIT (OUTPATIENT)
Dept: FAMILY MEDICINE CLINIC | Facility: CLINIC | Age: 37
End: 2022-05-02

## 2022-05-02 VITALS
OXYGEN SATURATION: 99 % | DIASTOLIC BLOOD PRESSURE: 80 MMHG | SYSTOLIC BLOOD PRESSURE: 134 MMHG | WEIGHT: 245.38 LBS | BODY MASS INDEX: 30.51 KG/M2 | RESPIRATION RATE: 22 BRPM | TEMPERATURE: 98.7 F | HEIGHT: 75 IN | HEART RATE: 66 BPM

## 2022-05-02 DIAGNOSIS — Z23 ENCOUNTER FOR IMMUNIZATION: Primary | ICD-10-CM

## 2022-05-02 LAB
F2 GENE MUT ANL BLD/T: NORMAL
F5 GENE MUT ANL BLD/T: NORMAL

## 2022-05-02 NOTE — PROGRESS NOTES
Assessment/Plan:    Encounter for immunization  Patient would like coverage for pertussis as baby is due in May  Last Tdap 2007      - administered Tdap today    RTC prn       Diagnoses and all orders for this visit:    Encounter for immunization  -     TDAP VACCINE GREATER THAN OR EQUAL TO 8YO IM          Subjective:      Patient ID: Bry Quintero is a 31 y o  male  Bry Quintero is 30 yo male who presents to the office for a TdaP vaccine as he notes he has his first child coming in May and would like to be covered for pertussis  Patient denies nausea, vomiting, and any new symptoms  The following portions of the patient's history were reviewed and updated as appropriate: problem list     Review of Systems   Constitutional: Negative for chills and fever  HENT: Negative for sore throat  Respiratory: Negative for cough and shortness of breath  Cardiovascular: Negative for chest pain and palpitations  Gastrointestinal: Negative for abdominal pain, nausea and vomiting  Musculoskeletal: Negative for arthralgias and back pain  Skin: Negative for rash  All other systems reviewed and are negative  Objective:      /80 (BP Location: Left arm, Patient Position: Sitting, Cuff Size: Large)   Pulse 66   Temp 98 7 °F (37 1 °C) (Temporal)   Resp 22   Ht 6' 3" (1 905 m)   Wt 111 kg (245 lb 6 oz)   SpO2 99%   BMI 30 67 kg/m²          Physical Exam  Vitals reviewed  Constitutional:       General: He is not in acute distress  Appearance: Normal appearance  He is not toxic-appearing  HENT:      Head: Normocephalic and atraumatic  Nose: Nose normal    Eyes:      General: No scleral icterus  Right eye: No discharge  Left eye: No discharge  Conjunctiva/sclera: Conjunctivae normal    Cardiovascular:      Rate and Rhythm: Normal rate and regular rhythm  Heart sounds: Normal heart sounds  No murmur heard        Pulmonary:      Effort: Pulmonary effort is normal  No respiratory distress  Breath sounds: Normal breath sounds  No wheezing  Musculoskeletal:      Cervical back: Normal range of motion  Skin:     General: Skin is warm and dry  Findings: No rash  Neurological:      Mental Status: He is alert     Psychiatric:         Mood and Affect: Mood normal          Behavior: Behavior normal

## 2022-05-02 NOTE — ASSESSMENT & PLAN NOTE
Patient would like coverage for pertussis as baby is due in May   Last Tdap 2007      - administered Tdap today    RTC prn

## 2022-05-05 LAB — HLA-B27 QL NAA+PROBE: NEGATIVE

## 2022-05-11 LAB — MISCELLANEOUS LAB TEST RESULT: NORMAL

## 2024-06-28 ENCOUNTER — OFFICE VISIT (OUTPATIENT)
Dept: FAMILY MEDICINE CLINIC | Facility: CLINIC | Age: 39
End: 2024-06-28
Payer: COMMERCIAL

## 2024-06-28 VITALS
WEIGHT: 225 LBS | SYSTOLIC BLOOD PRESSURE: 110 MMHG | DIASTOLIC BLOOD PRESSURE: 70 MMHG | HEART RATE: 80 BPM | TEMPERATURE: 98.2 F | BODY MASS INDEX: 28.12 KG/M2 | OXYGEN SATURATION: 97 %

## 2024-06-28 DIAGNOSIS — S00.521A BLISTER (NONTHERMAL) OF LIP, INITIAL ENCOUNTER: Primary | ICD-10-CM

## 2024-06-28 PROCEDURE — 99213 OFFICE O/P EST LOW 20 MIN: CPT

## 2024-06-28 NOTE — PROGRESS NOTES
Ambulatory Visit  Name: Davonte Pedersen      : 1985      MRN: [id number]  Encounter Provider: Kem Mariscal MD  Encounter Date: 2024   Encounter department: [facility]    Assessment & Plan   1. Blister (nonthermal) of lip, initial encounter  Assessment & Plan:  Pt presenting with a fluid-filled midline blister about 0.5cm diameter on his lower lip. He denies any pain, the blister is noninflammed, pt has not GI symptoms, vision symptoms, rashes. He does not recall trauma or heat to area; he notes he had a lot of sour candy recently.  Isolated blister present for 24-48h in absence of other symptoms.  - Pt advised to monitor the resolution of the blister. If it does not resolve over the next 1-2 weeks or if symptoms change, call office to schedule follow up       History of Present Illness     HPI  Pt presents with a blister centrally on his lower lip  Started small yesterday, grew overnight to about 0.5cm in diameter.  4y ago pt had a similar blister, it went away on its own eventually.   Pt denies pain.   No new sexual encounters, no new kissing partners.  No known new exposures.  Eating a lot of a sour candy. Does not recall any hot beverages, no hot food, or burning slef otherwise.  Has had cold sores in the past.  He had a rash of small raised red bumps months ago, no other recent rashes.  No other blisters.  Chronic medical conditions - none pt is aware of.  Only new thing is pt went to [facility].    Review of Systems   Constitutional:  Negative for chills and fever.   HENT:  Negative for ear pain and sore throat.    Eyes:  Negative for pain and visual disturbance.   Respiratory:  Negative for cough and shortness of breath.    Cardiovascular:  Negative for chest pain and palpitations.   Gastrointestinal:  Negative for abdominal pain and vomiting.   Genitourinary:  Negative for dysuria and hematuria.   Musculoskeletal:  Negative for arthralgias and back pain.   Skin:   Negative for color change and rash.        Blister on lip   Neurological:  Negative for seizures and syncope.     Medical History Reviewed by provider this encounter:       Past Medical History   Past Medical History:   Diagnosis Date    Hemorrhoids     Rectal bleed     Right hip pain      Past Surgical History:   Procedure Laterality Date    FOOT SURGERY Left     toe-replaced the joint of the great toe with silicone    WISDOM TOOTH EXTRACTION      x4     Family History   Problem Relation Age of Onset    Cancer Mother         breast    Hypertension Mother     Cancer Father         prostate     No current outpatient medications on file prior to visit.     No current facility-administered medications on file prior to visit.   No Known Allergies   No current outpatient medications on file prior to visit.     No current facility-administered medications on file prior to visit.      Social History     Tobacco Use    Smoking status: Never    Smokeless tobacco: Never   Vaping Use    Vaping status: Some Days   Substance and Sexual Activity    Alcohol use: Yes     Alcohol/week: 4.0 standard drinks of alcohol     Types: 4 Cans of beer per week     Comment: occassionally    Drug use: Not on file    Sexual activity: Not on file     Objective     /70 (BP Location: Left arm, Patient Position: Sitting, Cuff Size: Large)   Pulse 80   Temp 98.2 °F (36.8 °C) (Tympanic)   Wt 102 kg (225 lb)   SpO2 97%   BMI 28.12 kg/m²     Physical Exam  Vitals and nursing note reviewed.   Constitutional:       General: He is not in acute distress.     Appearance: He is well-developed.   HENT:      Head: Normocephalic and atraumatic.   Eyes:      General:         Right eye: No discharge.         Left eye: No discharge.      Conjunctiva/sclera: Conjunctivae normal.   Cardiovascular:      Rate and Rhythm: Normal rate.   Pulmonary:      Effort: Pulmonary effort is normal. No respiratory distress.      Breath sounds: Normal breath sounds.    Musculoskeletal:      Cervical back: Neck supple.   Skin:     General: Skin is warm and dry.      Capillary Refill: Capillary refill takes less than 2 seconds.      Findings: No rash.      Comments: Midline 0.5cm diameter fluid filled noninflammed blister on lower lip   Neurological:      Mental Status: He is alert.   Psychiatric:         Mood and Affect: Mood normal.       Administrative Statements   I have spent a total time of 20 minutes in caring for this patient on the day of the visit/encounter including Impressions, Counseling / Coordination of care, Documenting in the medical record, and Obtaining or reviewing history  .

## 2024-06-28 NOTE — ASSESSMENT & PLAN NOTE
Pt presenting with a fluid-filled midline blister about 0.5cm diameter on his lower lip. He denies any pain, the blister is noninflammed, pt has not GI symptoms, vision symptoms, rashes. He does not recall trauma or heat to area; he notes he had a lot of sour candy recently.  Isolated blister present for 24-48h in absence of other symptoms.  - Pt advised to monitor the resolution of the blister. If it does not resolve over the next 1-2 weeks or if symptoms change, call office to schedule follow up

## 2024-08-20 ENCOUNTER — OFFICE VISIT (OUTPATIENT)
Dept: FAMILY MEDICINE CLINIC | Facility: CLINIC | Age: 39
End: 2024-08-20
Payer: COMMERCIAL

## 2024-08-20 VITALS
WEIGHT: 225 LBS | HEART RATE: 62 BPM | SYSTOLIC BLOOD PRESSURE: 133 MMHG | TEMPERATURE: 97.6 F | DIASTOLIC BLOOD PRESSURE: 88 MMHG | OXYGEN SATURATION: 100 % | HEIGHT: 75 IN | BODY MASS INDEX: 27.98 KG/M2

## 2024-08-20 DIAGNOSIS — R22.1 LUMP IN NECK: ICD-10-CM

## 2024-08-20 DIAGNOSIS — Z00.00 ANNUAL PHYSICAL EXAM: Primary | ICD-10-CM

## 2024-08-20 DIAGNOSIS — Z13.1 SCREENING FOR DIABETES MELLITUS (DM): ICD-10-CM

## 2024-08-20 DIAGNOSIS — Z13.6 SCREENING FOR CARDIOVASCULAR CONDITION: ICD-10-CM

## 2024-08-20 DIAGNOSIS — Z11.59 NEED FOR HEPATITIS C SCREENING TEST: ICD-10-CM

## 2024-08-20 DIAGNOSIS — Z11.4 SCREENING FOR HIV (HUMAN IMMUNODEFICIENCY VIRUS): ICD-10-CM

## 2024-08-20 PROCEDURE — 99395 PREV VISIT EST AGE 18-39: CPT

## 2024-08-20 NOTE — PROGRESS NOTES
Adult Annual Physical  Name: Davonte Pedersen      : 1985      MRN: [id number]  Encounter Provider: Citlalli Martell DO  Encounter Date: 2024   Encounter department: [facility]    Assessment & Plan   1. Annual physical exam  2. Need for hepatitis C screening test  -     Hepatitis C antibody; Future  3. Screening for HIV (human immunodeficiency virus)  -     HIV 1/2 AG/AB w Reflex [facility] for 2 yr old and above; Future  4. Screening for diabetes mellitus (DM)  -     Basic metabolic panel; Future  5. Screening for cardiovascular condition  -     Lipid panel; Future  6. Lump in neck  Assessment & Plan:  Since patient's neck lump/mass is nontender and seems to be less painful and smaller than when he first noticed it, we will watch it to see if it goes away on its own. Follow up in 1 month for this. Could be thyroid nodule or cyst, blocked sweat gland/hair follicle, or inflammed lymph node. Can consider CBC and TSH with reflex at that time, and potential thyroid ultrasound  Immunizations and preventive care screenings were discussed with patient today. Appropriate education was printed on patient's after visit summary.  Ordered screening labs for patient today BMP, Hep C, HIV, and lipid panel. Will call him with results or discuss at his follow up appointment.    Counseling:  Dental Health: discussed importance of regular tooth brushing, flossing, and dental visits.  Sexual health: discussed sexually transmitted diseases, partner selection, use of condoms, avoidance of unintended pregnancy, and contraceptive alternatives.  Exercise: the importance of regular exercise/physical activity was discussed. Recommend exercise 3-5 times per week for at least 30 minutes.       Depression Screening and Follow-up Plan: Patient was screened for depression during today's encounter. They screened negative with a PHQ-2 score of 0.    Nutrition Assessment and Intervention:     Recommended completion of food  recall journal      Physical Activity Assessment and Intervention:    Activity journal completion recommended      Emotional and Mental Well-being, Sleep, Connectedness Assessment and Intervention:    Sleep/stress assessment performed    Depression and anxiety screening performed and reviewed    PHQ-9 or GAD7 performed for initial evaluation or follow-up         History of Present Illness   {Disappearing Hyperlinks I Encounters * My Last Note * Since Last Visit * History :49480}  Adult Annual Physical:  Patient presents for annual physical. Lump in tho rat for 2 weeks, only painful when looking up. Never had before. No sick symptoms. Can feel it from the outside. Had fever in the morning 1 week ago then went away. The pain has moved down and it does not seem as large as it was when he first noticed it. .     Diet and Physical Activity:  - Diet/Nutrition: well balanced diet and consuming 3-5 servings of fruits/vegetables daily. favorite food is seafood, eats a lot of ice cream  - Exercise: 1-2 times a week on average and 1-2 hours on average. basketball    Depression Screening:  - PHQ-2 Score: 0    General Health:  - Sleep: sleeps well and 7-8 hours of sleep on average.  - Hearing: normal hearing right ear and normal hearing left ear.  - Vision: most recent eye exam > 1 year ago and wears glasses.  - Dental: regular dental visits and brushes teeth twice daily.     Health:  - History of STDs: no.   - Urinary symptoms: none.     Advanced Care Planning:  - Has an advanced directive?: no    - Has a durable medical POA?: no    - ACP document given to patient?: yes      Review of Systems   Constitutional:  Negative for chills and fever.   HENT:  Negative for congestion, ear pain and sore throat.         Neck lump painful with looking up   Eyes:  Negative for pain and visual disturbance.   Respiratory:  Negative for cough and shortness of breath.    Cardiovascular:  Negative for chest pain and palpitations.  "  Gastrointestinal:  Negative for abdominal pain and vomiting.   Endocrine: Negative for cold intolerance and heat intolerance.   Genitourinary:  Negative for difficulty urinating, dysuria and hematuria.   Musculoskeletal:  Negative for arthralgias and back pain.   Skin:  Negative for color change and rash.   Neurological:  Negative for dizziness, seizures, syncope, light-headedness and headaches.   Psychiatric/Behavioral:  The patient is not nervous/anxious.    All other systems reviewed and are negative.    Pertinent Medical History   Medical History Reviewed by provider this encounter:       No current outpatient medications on file prior to visit.     No current facility-administered medications on file prior to visit.      Social History     Tobacco Use   • Smoking status: Never   • Smokeless tobacco: Never   Vaping Use   • Vaping status: Some Days   Substance and Sexual Activity   • Alcohol use: Yes     Alcohol/week: 4.0 standard drinks of alcohol     Types: 4 Cans of beer per week     Comment: occassionally   • Drug use: Yes     Types: Marijuana   • Sexual activity: Not on file       Objective   {Disappearing Hyperlinks   Review Vitals * Enter New Vitals * Results Review * Labs * Imaging * Cardiology * Procedures * Lung Cancer Screening :95245}  /88 (BP Location: Right arm, Patient Position: Sitting, Cuff Size: Large)   Pulse 62   Temp 97.6 °F (36.4 °C) (Tympanic)   Ht 6' 3\" (1.905 m)   Wt 102 kg (225 lb)   SpO2 100%   BMI 28.12 kg/m²     Physical Exam  Vitals and nursing note reviewed.   Constitutional:       General: He is not in acute distress.     Appearance: He is well-developed.   HENT:      Head: Normocephalic and atraumatic.      Right Ear: Tympanic membrane, ear canal and external ear normal.      Left Ear: Tympanic membrane, ear canal and external ear normal.      Nose: Nose normal.      Mouth/Throat:      Mouth: Mucous membranes are moist.      Pharynx: Oropharynx is clear.   Eyes:     "  Conjunctiva/sclera: Conjunctivae normal.   Neck:      Comments: Palpable nodule on left region of thyroid. Nontender. Superficial. Fixed. Smaller than a dime in size. No redness or warmth in the region.   Cardiovascular:      Rate and Rhythm: Normal rate and regular rhythm.      Heart sounds: No murmur heard.  Pulmonary:      Effort: Pulmonary effort is normal. No respiratory distress.      Breath sounds: Normal breath sounds.   Abdominal:      Palpations: Abdomen is soft.      Tenderness: There is no abdominal tenderness.   Musculoskeletal:         General: No swelling. Normal range of motion.      Cervical back: Normal range of motion and neck supple.   Skin:     General: Skin is warm and dry.      Capillary Refill: Capillary refill takes less than 2 seconds.   Neurological:      General: No focal deficit present.      Mental Status: He is alert and oriented to person, place, and time.   Psychiatric:         Mood and Affect: Mood normal.         Behavior: Behavior normal.       Administrative Statements {Disappearing Hyperlinks I  Level of Service * [facility]:28066}  I have spent a total time of 40 minutes in caring for this patient on the day of the visit/encounter including Patient and family education, Risk factor reductions, Impressions, Counseling / Coordination of care, Documenting in the medical record, Reviewing / ordering tests, medicine, procedures  , and Obtaining or reviewing history  .

## 2024-08-20 NOTE — ASSESSMENT & PLAN NOTE
Since patient's neck lump/mass is nontender and seems to be less painful and smaller than when he first noticed it, we will watch it to see if it goes away on its own. Follow up in 1 month for this. Could be thyroid nodule or cyst, blocked sweat gland/hair follicle, or inflammed lymph node. Can consider CBC and TSH with reflex at that time, and potential thyroid ultrasound

## 2024-08-20 NOTE — PATIENT INSTRUCTIONS
"Patient Education     Routine physical for adults   The Basics   Written by the doctors and editors at [facility]   What is a physical? -- A physical is a routine visit, or \"check-up,\" with your doctor. You might also hear it called a \"wellness visit\" or \"preventive visit.\"  During each visit, the doctor will:   Ask about your physical and mental health   Ask about your habits, behaviors, and lifestyle   Do an exam   Give you vaccines if needed   Talk to you about any medicines you take   Give advice about your health   Answer your questions  Getting regular check-ups is an important part of taking care of your health. It can help your doctor find and treat any problems you have. But it's also important for preventing health problems.  A routine physical is different from a \"sick visit.\" A sick visit is when you see a doctor because of a health concern or problem. Since physicals are scheduled ahead of time, you can think about what you want to ask the doctor.  How often should I get a physical? -- It depends on your age and health. In general, for people age 21 years and older:   If you are younger than 50 years, you might be able to get a physical every 3 years.   If you are 50 years or older, your doctor might recommend a physical every year.  If you have an ongoing health condition, like diabetes or high blood pressure, your doctor will probably want to see you more often.  What happens during a physical? -- In general, each visit will include:   Physical exam - The doctor or nurse will check your height, weight, heart rate, and blood pressure. They will also look at your eyes and ears. They will ask about how you are feeling and whether you have any symptoms that bother you.   Medicines - It's a good idea to bring a list of all the medicines you take to each doctor visit. Your doctor will talk to you about your medicines and answer any questions. Tell them if you are having any side effects that bother you. You " "should also tell them if you are having trouble paying for any of your medicines.   Habits and behaviors - This includes:   Your diet   Your exercise habits   Whether you smoke, drink alcohol, or use drugs   Whether you are sexually active   Whether you feel safe at home  Your doctor will talk to you about things you can do to improve your health and lower your risk of health problems. They will also offer help and support. For example, if you want to quit smoking, they can give you advice and might prescribe medicines. If you want to improve your diet or get more physical activity, they can help you with this, too.   Lab tests, if needed - The tests you get will depend on your age and situation. For example, your doctor might want to check your:   Cholesterol   Blood sugar   Iron level   Vaccines - The recommended vaccines will depend on your age, health, and what vaccines you already had. Vaccines are very important because they can prevent certain serious or deadly infections.   Discussion of screening - \"Screening\" means checking for diseases or other health problems before they cause symptoms. Your doctor can recommend screening based on your age, risk, and preferences. This might include tests to check for:   Cancer, such as breast, prostate, cervical, ovarian, colorectal, prostate, lung, or skin cancer   Sexually transmitted infections, such as chlamydia and gonorrhea   Mental health conditions like depression and anxiety  Your doctor will talk to you about the different types of screening tests. They can help you decide which screenings to have. They can also explain what the results might mean.   Answering questions - The physical is a good time to ask the doctor or nurse questions about your health. If needed, they can refer you to other doctors or specialists, too.  Adults older than 65 years often need other care, too. As you get older, your doctor will talk to you about:   How to prevent falling at " home   Hearing or vision tests   Memory testing   How to take your medicines safely   Making sure that you have the help and support you need at home  All topics are updated as new evidence becomes available and our peer review process is complete.  This topic retrieved from [employer] on: May 02, 2024.  Topic 355531 Version 1.0  Release: 32.4.3 - C32.122  © 2024 [employer] and/or its affiliates. All rights reserved.  Consumer Information Use and Disclaimer   Disclaimer: This generalized information is a limited summary of diagnosis, treatment, and/or medication information. It is not meant to be comprehensive and should be used as a tool to help the user understand and/or assess potential diagnostic and treatment options. It does NOT include all information about conditions, treatments, medications, side effects, or risks that may apply to a specific patient. It is not intended to be medical advice or a substitute for the medical advice, diagnosis, or treatment of a health care provider based on the health care provider's examination and assessment of a patient's specific and unique circumstances. Patients must speak with a health care provider for complete information about their health, medical questions, and treatment options, including any risks or benefits regarding use of medications. This information does not endorse any treatments or medications as safe, effective, or approved for treating a specific patient. [employer] and its affiliates disclaim any warranty or liability relating to this information or the use thereof.The use of this information is governed by the Terms of Use, available at https://www.woltersReplyBuyuwer.com/en/know/clinical-effectiveness-terms. 2024© [employer]Date, Inc. and its affiliates and/or licensors. All rights reserved.  Copyright   © 2024 [employer] and/or its affiliates. All rights reserved.  Fast for 10 hours prior to bloodwork

## 2024-09-03 ENCOUNTER — APPOINTMENT (OUTPATIENT)
Dept: LAB | Facility: CLINIC | Age: 39
End: 2024-09-03
Payer: COMMERCIAL

## 2024-09-03 DIAGNOSIS — Z13.6 SCREENING FOR CARDIOVASCULAR CONDITION: ICD-10-CM

## 2024-09-03 DIAGNOSIS — Z11.4 SCREENING FOR HIV (HUMAN IMMUNODEFICIENCY VIRUS): ICD-10-CM

## 2024-09-03 DIAGNOSIS — Z11.59 NEED FOR HEPATITIS C SCREENING TEST: ICD-10-CM

## 2024-09-03 DIAGNOSIS — Z13.1 SCREENING FOR DIABETES MELLITUS (DM): ICD-10-CM

## 2024-09-03 LAB
ANION GAP SERPL CALCULATED.3IONS-SCNC: 5 MMOL/L (ref 4–13)
BUN SERPL-MCNC: 13 MG/DL (ref 5–25)
CALCIUM SERPL-MCNC: 9.4 MG/DL (ref 8.4–10.2)
CHLORIDE SERPL-SCNC: 103 MMOL/L (ref 96–108)
CHOLEST SERPL-MCNC: 159 MG/DL
CO2 SERPL-SCNC: 30 MMOL/L (ref 21–32)
CREAT SERPL-MCNC: 1.12 MG/DL (ref 0.6–1.3)
GFR SERPL CREATININE-BSD FRML MDRD: 83 ML/MIN/1.73SQ M
GLUCOSE P FAST SERPL-MCNC: 96 MG/DL (ref 65–99)
HCV AB SER QL: NORMAL
HDLC SERPL-MCNC: 81 MG/DL
HIV 1+2 AB+HIV1 P24 AG SERPL QL IA: NORMAL
HIV 2 AB SERPL QL IA: NORMAL
HIV1 AB SERPL QL IA: NORMAL
HIV1 P24 AG SERPL QL IA: NORMAL
LDLC SERPL CALC-MCNC: 70 MG/DL (ref 0–100)
NONHDLC SERPL-MCNC: 78 MG/DL
POTASSIUM SERPL-SCNC: 4.5 MMOL/L (ref 3.5–5.3)
SODIUM SERPL-SCNC: 138 MMOL/L (ref 135–147)
TRIGL SERPL-MCNC: 41 MG/DL

## 2024-09-03 PROCEDURE — 80061 LIPID PANEL: CPT

## 2024-09-03 PROCEDURE — 36415 COLL VENOUS BLD VENIPUNCTURE: CPT

## 2024-09-03 PROCEDURE — 80048 BASIC METABOLIC PNL TOTAL CA: CPT

## 2024-09-03 PROCEDURE — 87389 HIV-1 AG W/HIV-1&-2 AB AG IA: CPT

## 2024-09-03 PROCEDURE — 86803 HEPATITIS C AB TEST: CPT

## 2024-09-04 ENCOUNTER — TELEPHONE (OUTPATIENT)
Dept: FAMILY MEDICINE CLINIC | Facility: CLINIC | Age: 39
End: 2024-09-04

## 2024-09-04 NOTE — TELEPHONE ENCOUNTER
Discussed with patient results of lab work including Hep C, HIV, BMP, and lipid panel. All results were normal and patient had no questions. Will continue with yearly blood work.

## 2024-09-17 ENCOUNTER — OFFICE VISIT (OUTPATIENT)
Dept: FAMILY MEDICINE CLINIC | Facility: CLINIC | Age: 39
End: 2024-09-17
Payer: COMMERCIAL

## 2024-09-17 VITALS
HEART RATE: 63 BPM | OXYGEN SATURATION: 100 % | HEIGHT: 75 IN | TEMPERATURE: 97.1 F | DIASTOLIC BLOOD PRESSURE: 82 MMHG | WEIGHT: 228 LBS | BODY MASS INDEX: 28.35 KG/M2 | SYSTOLIC BLOOD PRESSURE: 118 MMHG

## 2024-09-17 DIAGNOSIS — R22.1 LUMP IN NECK: Primary | ICD-10-CM

## 2024-09-17 PROCEDURE — 99213 OFFICE O/P EST LOW 20 MIN: CPT

## 2024-09-17 NOTE — ASSESSMENT & PLAN NOTE
Since the lump in the neck has resolved, I did not recommend further testing such as the TSH and CBC we talked about at his last visit. I also do not feel that an ultrasound of that area is necessary. I told him that if it comes back, give us a call and we can do further testing to figure out what the lump was. Since it went away, I feel like it is most likely to have been an inclusion cyst. Also could have been an inflamed lymph node. Less likely thyroid nodule. Patient's next physical due in 8/2025, can come back sooner for the lump if needed if it returns.